# Patient Record
Sex: MALE | Race: WHITE | NOT HISPANIC OR LATINO | Employment: FULL TIME | URBAN - METROPOLITAN AREA
[De-identification: names, ages, dates, MRNs, and addresses within clinical notes are randomized per-mention and may not be internally consistent; named-entity substitution may affect disease eponyms.]

---

## 2020-04-20 ENCOUNTER — OFFICE VISIT (OUTPATIENT)
Dept: FAMILY MEDICINE | Facility: CLINIC | Age: 30
End: 2020-04-20
Payer: COMMERCIAL

## 2020-04-20 VITALS
DIASTOLIC BLOOD PRESSURE: 80 MMHG | OXYGEN SATURATION: 96 % | TEMPERATURE: 98 F | HEART RATE: 85 BPM | SYSTOLIC BLOOD PRESSURE: 126 MMHG | WEIGHT: 219.81 LBS | BODY MASS INDEX: 31.54 KG/M2

## 2020-04-20 DIAGNOSIS — R30.0 DYSURIA: Primary | ICD-10-CM

## 2020-04-20 LAB
BILIRUB SERPL-MCNC: NORMAL MG/DL
BLOOD URINE, POC: NORMAL
COLOR, POC UA: NORMAL
GLUCOSE UR QL STRIP: NORMAL
KETONES UR QL STRIP: NORMAL
LEUKOCYTE ESTERASE URINE, POC: NORMAL
NITRITE, POC UA: NORMAL
PH, POC UA: 7
PROTEIN, POC: NORMAL
SPECIFIC GRAVITY, POC UA: 1.01
UROBILINOGEN, POC UA: NORMAL

## 2020-04-20 PROCEDURE — 99203 PR OFFICE/OUTPT VISIT, NEW, LEVL III, 30-44 MIN: ICD-10-PCS | Mod: 25,S$GLB,, | Performed by: PHYSICIAN ASSISTANT

## 2020-04-20 PROCEDURE — 87491 CHLMYD TRACH DNA AMP PROBE: CPT

## 2020-04-20 PROCEDURE — 81002 POCT URINE DIPSTICK WITHOUT MICROSCOPE: ICD-10-PCS | Mod: S$GLB,,, | Performed by: PHYSICIAN ASSISTANT

## 2020-04-20 PROCEDURE — 87086 URINE CULTURE/COLONY COUNT: CPT

## 2020-04-20 PROCEDURE — 3008F BODY MASS INDEX DOCD: CPT | Mod: CPTII,S$GLB,, | Performed by: PHYSICIAN ASSISTANT

## 2020-04-20 PROCEDURE — 99203 OFFICE O/P NEW LOW 30 MIN: CPT | Mod: 25,S$GLB,, | Performed by: PHYSICIAN ASSISTANT

## 2020-04-20 PROCEDURE — 81002 URINALYSIS NONAUTO W/O SCOPE: CPT | Mod: S$GLB,,, | Performed by: PHYSICIAN ASSISTANT

## 2020-04-20 PROCEDURE — 3008F PR BODY MASS INDEX (BMI) DOCUMENTED: ICD-10-PCS | Mod: CPTII,S$GLB,, | Performed by: PHYSICIAN ASSISTANT

## 2020-04-20 NOTE — PROGRESS NOTES
Subjective:       Patient ID: Antoine Valdez is a 30 y.o. male.    Chief Complaint: Urinary Tract Infection    Dysuria    This is a new problem. The problem has been resolved. The pain is at a severity of 0/10. The patient is experiencing no pain. There has been no fever. He is sexually active. There is no history of pyelonephritis. Pertinent negatives include no chills, discharge, flank pain, frequency, hematuria, nausea or urgency. He has tried nothing for the symptoms.     Past Medical History:   Diagnosis Date    Allergy     Asthma     Headache(784.0)     Seizures     Strep throat     Ulcerative colitis        Review of Systems   Constitutional: Negative for chills, fatigue and fever.   Respiratory: Negative for chest tightness and shortness of breath.    Cardiovascular: Negative for chest pain.   Gastrointestinal: Negative for abdominal pain and nausea.   Genitourinary: Positive for dysuria. Negative for flank pain, frequency, hematuria and urgency.       Objective:      Physical Exam   Constitutional: He appears well-developed and well-nourished. No distress.   Cardiovascular: Normal rate, regular rhythm, normal heart sounds and intact distal pulses. Exam reveals no gallop and no friction rub.   No murmur heard.  Pulmonary/Chest: Effort normal and breath sounds normal. No stridor. No respiratory distress. He has no wheezes. He has no rales. He exhibits no tenderness.   Abdominal: Soft. Bowel sounds are normal. There is no tenderness.   Skin: He is not diaphoretic.   Nursing note and vitals reviewed.      Assessment:       1. Dysuria        Plan:       Dysuria  -     C. trachomatis/N. gonorrhoeae by AMP DNA  -     POCT urine dipstick without microscope  -     Urine culture

## 2020-04-21 LAB
BACTERIA UR CULT: NO GROWTH
C TRACH DNA SPEC QL NAA+PROBE: NOT DETECTED
N GONORRHOEA DNA SPEC QL NAA+PROBE: NOT DETECTED

## 2021-05-10 ENCOUNTER — PATIENT MESSAGE (OUTPATIENT)
Dept: RESEARCH | Facility: HOSPITAL | Age: 31
End: 2021-05-10

## 2021-08-09 ENCOUNTER — TELEPHONE (OUTPATIENT)
Dept: GASTROENTEROLOGY | Facility: CLINIC | Age: 31
End: 2021-08-09

## 2021-08-24 ENCOUNTER — TELEPHONE (OUTPATIENT)
Dept: GASTROENTEROLOGY | Facility: CLINIC | Age: 31
End: 2021-08-24

## 2021-09-10 ENCOUNTER — TELEPHONE (OUTPATIENT)
Dept: GASTROENTEROLOGY | Facility: CLINIC | Age: 31
End: 2021-09-10

## 2022-10-03 ENCOUNTER — OFFICE VISIT (OUTPATIENT)
Dept: SURGERY | Facility: CLINIC | Age: 32
End: 2022-10-03
Payer: COMMERCIAL

## 2022-10-03 VITALS
BODY MASS INDEX: 33.67 KG/M2 | HEART RATE: 75 BPM | HEIGHT: 69 IN | WEIGHT: 227.31 LBS | DIASTOLIC BLOOD PRESSURE: 72 MMHG | SYSTOLIC BLOOD PRESSURE: 127 MMHG | TEMPERATURE: 98 F

## 2022-10-03 DIAGNOSIS — K42.9 UMBILICAL HERNIA WITHOUT OBSTRUCTION AND WITHOUT GANGRENE: Primary | ICD-10-CM

## 2022-10-03 PROCEDURE — 3078F PR MOST RECENT DIASTOLIC BLOOD PRESSURE < 80 MM HG: ICD-10-PCS | Mod: CPTII,S$GLB,, | Performed by: SURGERY

## 2022-10-03 PROCEDURE — 3078F DIAST BP <80 MM HG: CPT | Mod: CPTII,S$GLB,, | Performed by: SURGERY

## 2022-10-03 PROCEDURE — 99999 PR PBB SHADOW E&M-EST. PATIENT-LVL III: ICD-10-PCS | Mod: PBBFAC,,, | Performed by: SURGERY

## 2022-10-03 PROCEDURE — 99999 PR PBB SHADOW E&M-EST. PATIENT-LVL III: CPT | Mod: PBBFAC,,, | Performed by: SURGERY

## 2022-10-03 PROCEDURE — 3008F BODY MASS INDEX DOCD: CPT | Mod: CPTII,S$GLB,, | Performed by: SURGERY

## 2022-10-03 PROCEDURE — 3074F PR MOST RECENT SYSTOLIC BLOOD PRESSURE < 130 MM HG: ICD-10-PCS | Mod: CPTII,S$GLB,, | Performed by: SURGERY

## 2022-10-03 PROCEDURE — 1160F RVW MEDS BY RX/DR IN RCRD: CPT | Mod: CPTII,S$GLB,, | Performed by: SURGERY

## 2022-10-03 PROCEDURE — 1160F PR REVIEW ALL MEDS BY PRESCRIBER/CLIN PHARMACIST DOCUMENTED: ICD-10-PCS | Mod: CPTII,S$GLB,, | Performed by: SURGERY

## 2022-10-03 PROCEDURE — 3074F SYST BP LT 130 MM HG: CPT | Mod: CPTII,S$GLB,, | Performed by: SURGERY

## 2022-10-03 PROCEDURE — 99203 OFFICE O/P NEW LOW 30 MIN: CPT | Mod: S$GLB,,, | Performed by: SURGERY

## 2022-10-03 PROCEDURE — 1159F PR MEDICATION LIST DOCUMENTED IN MEDICAL RECORD: ICD-10-PCS | Mod: CPTII,S$GLB,, | Performed by: SURGERY

## 2022-10-03 PROCEDURE — 3008F PR BODY MASS INDEX (BMI) DOCUMENTED: ICD-10-PCS | Mod: CPTII,S$GLB,, | Performed by: SURGERY

## 2022-10-03 PROCEDURE — 99203 PR OFFICE/OUTPT VISIT, NEW, LEVL III, 30-44 MIN: ICD-10-PCS | Mod: S$GLB,,, | Performed by: SURGERY

## 2022-10-03 PROCEDURE — 1159F MED LIST DOCD IN RCRD: CPT | Mod: CPTII,S$GLB,, | Performed by: SURGERY

## 2022-10-03 RX ORDER — TESTOSTERONE CYPIONATE 200 MG/ML
200 INJECTION, SOLUTION INTRAMUSCULAR
Status: ON HOLD | COMMUNITY
End: 2023-02-23 | Stop reason: HOSPADM

## 2022-10-03 RX ORDER — LEVOTHYROXINE SODIUM 50 UG/1
50 TABLET ORAL
Status: ON HOLD | COMMUNITY
End: 2023-02-08

## 2022-10-03 RX ORDER — DEXTROAMPHETAMINE SACCHARATE, AMPHETAMINE ASPARTATE MONOHYDRATE, DEXTROAMPHETAMINE SULFATE AND AMPHETAMINE SULFATE 7.5; 7.5; 7.5; 7.5 MG/1; MG/1; MG/1; MG/1
30 CAPSULE, EXTENDED RELEASE ORAL 2 TIMES DAILY
COMMUNITY
Start: 2022-10-03 | End: 2023-05-02 | Stop reason: SDUPTHER

## 2022-10-03 NOTE — PROGRESS NOTES
Subjective:       Patient ID: Antoine Valdez is a 32 y.o. male.    Chief Complaint: Consult ()      HPI 32-year-old male presents with a complaint of an umbilical hernia.  He says this has been present for more than a year.  He denies any nausea or vomiting.  He is had no previous surgeries.  He wanted to have this checked in case he may need to have surgery.    Past Medical History:   Diagnosis Date    Allergy     Asthma     Headache(784.0)     Strep throat     Ulcerative colitis      Past Surgical History:   Procedure Laterality Date    LEG SURGERY           Current Outpatient Medications:     dextroamphetamine-amphetamine (ADDERALL XR) 30 MG 24 hr capsule, Take 30 mg by mouth 2 (two) times a day., Disp: , Rfl:     levothyroxine (SYNTHROID) 50 MCG tablet, Take 50 mcg by mouth before breakfast., Disp: , Rfl:     testosterone cypionate (DEPOTESTOTERONE CYPIONATE) 200 mg/mL injection, Inject 200 mg into the muscle every 7 days., Disp: , Rfl:     Review of patient's allergies indicates:   Allergen Reactions    Morphine Itching       Family History   Problem Relation Age of Onset    Diabetes Mother     Heart failure Father      Social History     Socioeconomic History    Marital status:    Tobacco Use    Smoking status: Never    Smokeless tobacco: Never   Substance and Sexual Activity    Alcohol use: No    Drug use: Never    Sexual activity: Yes     Partners: Female       Review of Systems   Respiratory: Negative.     Cardiovascular: Negative.    Gastrointestinal: Negative.    Objective:     Physical Exam  Constitutional:       General: He is not in acute distress.     Appearance: He is well-developed.   HENT:      Head: Normocephalic and atraumatic.   Eyes:      General: No scleral icterus.     Conjunctiva/sclera: Conjunctivae normal.      Pupils: Pupils are equal, round, and reactive to light.   Neck:      Thyroid: No thyromegaly.   Cardiovascular:      Rate and Rhythm: Normal rate and  regular rhythm.      Heart sounds: Normal heart sounds. No murmur heard.  Pulmonary:      Effort: Pulmonary effort is normal. No respiratory distress.      Breath sounds: Normal breath sounds. No wheezing or rales.   Abdominal:      General: Bowel sounds are normal. There is no distension.      Palpations: Abdomen is soft.      Tenderness: There is no abdominal tenderness.      Hernia: A hernia is present.      Comments: There is a small easily reducible umbilical hernia.  Just superior to this within 2 cm is what feels like a 2nd defect.  This is easily reducible.  There is some slight tenderness on manipulation but overall not very tender.   Musculoskeletal:         General: No tenderness. Normal range of motion.      Cervical back: Normal range of motion and neck supple.   Lymphadenopathy:      Cervical: No cervical adenopathy.   Skin:     General: Skin is warm and dry.      Findings: No erythema or rash.   Neurological:      Mental Status: He is alert and oriented to person, place, and time.   Psychiatric:         Behavior: Behavior normal.         Judgment: Judgment normal.     Assessment:     Encounter Diagnosis   Name Primary?    Umbilical hernia without obstruction and without gangrene Yes       Plan:      Discussed the option of repair.  We would probably proceed with a open repair possibly with mesh implantation.  The defect is very small.  Patient would like to consider this and perhaps proceed with surgery at some point next year.  He will call when he is ready to proceed.

## 2022-10-14 RX ORDER — AMOXICILLIN 875 MG/1
875 TABLET, FILM COATED ORAL EVERY 12 HOURS
Qty: 20 TABLET | Refills: 1 | Status: SHIPPED | OUTPATIENT
Start: 2022-10-14 | End: 2023-01-11

## 2022-10-14 RX ORDER — IBUPROFEN 800 MG/1
800 TABLET ORAL 3 TIMES DAILY
Qty: 60 TABLET | Refills: 1 | Status: SHIPPED | OUTPATIENT
Start: 2022-10-14 | End: 2023-01-11

## 2023-01-12 ENCOUNTER — TELEPHONE (OUTPATIENT)
Dept: GASTROENTEROLOGY | Facility: CLINIC | Age: 33
End: 2023-01-12
Payer: COMMERCIAL

## 2023-01-12 NOTE — TELEPHONE ENCOUNTER
There is nothing sooner at this time as Dr. Cobb is on maternity leave and he has the soonest available with the nurse practitioner.  He was added to the wait list.

## 2023-01-12 NOTE — TELEPHONE ENCOUNTER
----- Message from Megan Reis RRT sent at 1/11/2023 11:49 AM CST -----  Regarding: f/u [facility] ED 1/11 - UC/diarrhea  Patient initially scheduled an appointment with Dr. Cobb on 3/10. He has UC but has been in remission for a while. He is currently experiencing problems and having a lot of diarrhea. He came to the ED on 1/11. I scheduled an earlier appointment for him on 2/6 with Ele Wadsworth. Is there any way that he can be scheduled sooner since he's having problems? Please contact him if possible.       Thanks!    TONI Marinelli, RRT  ED Navigator, Case Management  [phone]  [facility]

## 2023-02-03 ENCOUNTER — TELEPHONE (OUTPATIENT)
Dept: GASTROENTEROLOGY | Facility: CLINIC | Age: 33
End: 2023-02-03
Payer: COMMERCIAL

## 2023-02-03 NOTE — TELEPHONE ENCOUNTER
I was able to move his 3/10 appointment with Dr. Cobb up to 3/2 for 3 pm.     Patient advised of appointment change, and that his appointment with the nurse practitioner was canceled.

## 2023-02-06 ENCOUNTER — PATIENT MESSAGE (OUTPATIENT)
Dept: GASTROENTEROLOGY | Facility: CLINIC | Age: 33
End: 2023-02-06
Payer: COMMERCIAL

## 2023-02-08 PROBLEM — F41.9 ANXIETY: Status: ACTIVE | Noted: 2023-02-08

## 2023-02-08 PROBLEM — A41.9 SEVERE SEPSIS: Status: ACTIVE | Noted: 2023-02-08

## 2023-02-08 PROBLEM — N17.9 AKI (ACUTE KIDNEY INJURY): Status: ACTIVE | Noted: 2023-02-08

## 2023-02-08 PROBLEM — M00.9 SEPTIC ARTHRITIS OF KNEE, LEFT: Status: ACTIVE | Noted: 2023-02-08

## 2023-02-08 PROBLEM — E87.1 HYPONATREMIA: Status: ACTIVE | Noted: 2023-02-08

## 2023-02-08 PROBLEM — R65.20 SEVERE SEPSIS: Status: ACTIVE | Noted: 2023-02-08

## 2023-02-09 PROBLEM — A41.9 SEPSIS: Status: ACTIVE | Noted: 2023-02-09

## 2023-02-10 PROBLEM — T81.40XA POSTOPERATIVE INFECTION: Status: ACTIVE | Noted: 2023-02-10

## 2023-02-10 PROBLEM — H57.02 ANISOCORIA: Status: ACTIVE | Noted: 2023-02-10

## 2023-02-11 PROBLEM — I82.621 ACUTE DEEP VEIN THROMBOSIS (DVT) OF BRACHIAL VEIN OF RIGHT UPPER EXTREMITY: Status: ACTIVE | Noted: 2023-02-11

## 2023-02-17 PROBLEM — A41.9 SEPSIS: Status: RESOLVED | Noted: 2023-02-09 | Resolved: 2023-02-17

## 2023-02-27 ENCOUNTER — PATIENT MESSAGE (OUTPATIENT)
Dept: GASTROENTEROLOGY | Facility: CLINIC | Age: 33
End: 2023-02-27
Payer: COMMERCIAL

## 2023-03-02 ENCOUNTER — OFFICE VISIT (OUTPATIENT)
Dept: GASTROENTEROLOGY | Facility: CLINIC | Age: 33
End: 2023-03-02
Payer: COMMERCIAL

## 2023-03-02 DIAGNOSIS — K51.919 ULCERATIVE COLITIS WITH COMPLICATION, UNSPECIFIED LOCATION: Primary | ICD-10-CM

## 2023-03-02 PROCEDURE — 1111F DSCHRG MED/CURRENT MED MERGE: CPT | Mod: CPTII,95,, | Performed by: INTERNAL MEDICINE

## 2023-03-02 PROCEDURE — 99204 OFFICE O/P NEW MOD 45 MIN: CPT | Mod: 95,,, | Performed by: INTERNAL MEDICINE

## 2023-03-02 PROCEDURE — 99204 PR OFFICE/OUTPT VISIT, NEW, LEVL IV, 45-59 MIN: ICD-10-PCS | Mod: 95,,, | Performed by: INTERNAL MEDICINE

## 2023-03-02 PROCEDURE — 1111F PR DISCHARGE MEDS RECONCILED W/ CURRENT OUTPATIENT MED LIST: ICD-10-PCS | Mod: CPTII,95,, | Performed by: INTERNAL MEDICINE

## 2023-03-02 NOTE — PROGRESS NOTES
The patient location is: [place]  The chief complaint leading to consultation is: Ulcerative colitis    Visit type: audiovisual    Face to Face time with patient: 30 minutes  45 minutes of total time spent on the encounter, which includes face to face time and non-face to face time preparing to see the patient (eg, review of tests), Obtaining and/or reviewing separately obtained history, Documenting clinical information in the electronic or other health record, Independently interpreting results (not separately reported) and communicating results to the patient/family/caregiver, or Care coordination (not separately reported).     Each patient to whom he or she provides medical services by telemedicine is:  (1) informed of the relationship between the physician and patient and the respective role of any other health care provider with respect to management of the patient; and (2) notified that he or she may decline to receive medical services by telemedicine and may withdraw from such care at any time.    Notes:     CC: Ulcerative colitis    HPI 32 y.o. male with ADHD, anxiety and hypothyroidism who presents for evaluation of reported history of ulcerative colitis. He states that many years ago he was diagnosed with UC by Dr. Palacio. He had a severe flare up in 2013. At the time he was very sick and required hospitalization in [facility] for several weeks. He had severe c.diff requiring stool transplant. He was on Remicade for 4 months and was in remission but unfortunately developed a reaction with blistering. He stopped medication and has been fine since. He has not had a colonoscopy for many years. Denies blood in stool, diarrhea. Has bowel movements twice daily. Decided to be seen because family member recommended. Spicy foods and beans trigger symptoms of pain and bloating.   .   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    In adedition  recently in Feb 2023 he was hospitalized with complicated septic arthosis postop infection of left knee and thigh cellulitis after ligamentous surgery. He has been on IV antibiotics. He is on Eliquis due to blood clot in right arm.     Medical records reviewed. Additional history supplemented by nursing.     Past Medical History:   Diagnosis Date    Allergy     Asthma     Headache(784.0)     Strep throat     Ulcerative colitis      Past Surgical History:   Procedure Laterality Date    CLOSURE OF WOUND Left 2/22/2023    Procedure: CLOSURE, WOUND;  Surgeon: Dante Moulton MD;  Location: [facility] OR;  Service: Orthopedics;  Laterality: Left;    INCISION AND DRAINAGE Left 2/22/2023    Procedure: Incision and Drainage-Knee;  Surgeon: Dante Moulton MD;  Location: [facility] OR;  Service: Orthopedics;  Laterality: Left;    INCISION AND DRAINAGE OF KNEE Left 2/8/2023    Procedure: INCISION AND DRAINAGE, KNEE;  Surgeon: Dante Moulton MD;  Location: [facility] OR;  Service: Orthopedics;  Laterality: Left;    IRRIGATION AND DEBRIDEMENT Left 2/10/2023    Procedure: IRRIGATION AND DEBRIDEMENT LEFT HIP & KNEE W/ WOUND VAC PLACEMENT;  Surgeon: Dante Moulton MD;  Location: [facility] OR;  Service: Orthopedics;  Laterality: Left;    LEG SURGERY       Social History  Social History     Tobacco Use    Smoking status: Never    Smokeless tobacco: Never   Substance Use Topics    Alcohol use: No    Drug use: Never     Family History   Problem Relation Age of Onset    Diabetes Mother     Heart failure Father      Review of Systems  General ROS: negative for chills, fever or weight loss  Psychological ROS: negative for hallucination, depression or suicidal ideation  Ophthalmic ROS: negative for blurry vision, photophobia or eye pain  ENT ROS: negative for epistaxis, sore throat or rhinorrhea  Respiratory ROS: no cough, shortness of breath, or wheezing  Cardiovascular ROS: no chest pain or dyspnea on exertion  Gastrointestinal ROS: no  "abdominal pain, change in bowel habits, or black/ bloody stools  Genito-Urinary ROS: no dysuria, trouble voiding, or hematuria  Musculoskeletal ROS: +gait disturbance, +muscular weakness  Neurological ROS: no syncope or seizures; no ataxia  Dermatological ROS: negative for pruritis, rash and jaundice    Physical Examination  VS: Ht: 5'10", Wt 99.3 kg  General appearance: alert, cooperative, no distress  HENT: Normocephalic, atraumatic, neck symmetrical  Eyes: conjunctivae clear  Lungs: No labored breathing  Skin: No jaundice  Neurologic: Alert and oriented X 3    Labs:  Lab Results   Component Value Date    WBC 8.57 02/23/2023    HGB 12.3 (L) 02/23/2023    HCT 37.6 (L) 02/23/2023    MCV 85 02/23/2023     (H) 02/23/2023     CMP  Sodium   Date Value Ref Range Status   02/23/2023 135 (L) 136 - 145 mmol/L Final   02/23/2023 135 (L) 136 - 145 mmol/L Final     Potassium   Date Value Ref Range Status   02/23/2023 4.0 3.5 - 5.1 mmol/L Final   02/23/2023 4.0 3.5 - 5.1 mmol/L Final     Chloride   Date Value Ref Range Status   02/23/2023 102 95 - 110 mmol/L Final   02/23/2023 102 95 - 110 mmol/L Final     CO2   Date Value Ref Range Status   02/23/2023 31 22 - 31 mmol/L Final   02/23/2023 31 22 - 31 mmol/L Final     Glucose   Date Value Ref Range Status   02/23/2023 107 70 - 110 mg/dL Final     Comment:     The ADA recommends the following guidelines for fasting glucose:    Normal:       less than 100 mg/dL    Prediabetes:  100 mg/dL to 125 mg/dL    Diabetes:     126 mg/dL or higher     02/23/2023 107 70 - 110 mg/dL Final     Comment:     The ADA recommends the following guidelines for fasting glucose:    Normal:       less than 100 mg/dL    Prediabetes:  100 mg/dL to 125 mg/dL    Diabetes:     126 mg/dL or higher       BUN   Date Value Ref Range Status   02/23/2023 19 9 - 21 mg/dL Final   02/23/2023 19 9 - 21 mg/dL Final     Creatinine   Date Value Ref Range Status   02/23/2023 1.16 0.50 - 1.40 mg/dL Final   02/23/2023 " 1.16 0.50 - 1.40 mg/dL Final     Calcium   Date Value Ref Range Status   02/23/2023 8.5 8.4 - 10.2 mg/dL Final   02/23/2023 8.5 8.4 - 10.2 mg/dL Final     Total Protein   Date Value Ref Range Status   02/16/2023 7.6 6.0 - 8.4 g/dL Final     Albumin   Date Value Ref Range Status   02/16/2023 3.8 3.5 - 5.2 g/dL Final     Total Bilirubin   Date Value Ref Range Status   02/16/2023 0.6 0.2 - 1.3 mg/dL Final     Alkaline Phosphatase   Date Value Ref Range Status   02/16/2023 56 38 - 145 U/L Final     AST   Date Value Ref Range Status   02/16/2023 64 (H) 17 - 59 U/L Final     ALT   Date Value Ref Range Status   02/16/2023 57 (H) 0 - 50 U/L Final     Anion Gap   Date Value Ref Range Status   02/23/2023 2 (L) 8 - 16 mmol/L Final   02/23/2023 2 (L) 8 - 16 mmol/L Final     eGFR if    Date Value Ref Range Status   02/03/2022 >60 >60 mL/min/1.73 m^2 Final     eGFR if non    Date Value Ref Range Status   02/03/2022 >60 >60 mL/min/1.73 m^2 Final     Comment:     Calculation used to obtain the estimated glomerular filtration  rate (eGFR) is the CKD-EPI equation.          Imaging:  US Soft tissue:  Impression:     Two complex collections within the left thigh with more distal collection appearing likely intramuscular. These could reflect hematomas or abscesses.     Independently reviewed    Assessment:   History of ulcerative colitis  History of c.diff requiring fecal transplant  History of Remicade use in past and development of reaction (blistering)  Post-operative Septic arthritis   On Eliquis for brachial vein thrombosis    Plan:   -Staging of bowel with colonoscopy  -Consider CTE or MRE depending on findings  -CRP  -Fecal calprotectin  -Obtain records from Dr. Palacio office including colonoscopy and path results from original diagnosis  -Follow up after colonoscopy      Jarret Cobb MD  [facility]  [address] [place] [address]  Office: [phone]  [phone]  Fax: [phone]

## 2023-03-06 ENCOUNTER — TELEPHONE (OUTPATIENT)
Dept: INFECTIOUS DISEASES | Facility: CLINIC | Age: 33
End: 2023-03-06
Payer: COMMERCIAL

## 2023-03-06 ENCOUNTER — TELEPHONE (OUTPATIENT)
Dept: HEMATOLOGY/ONCOLOGY | Facility: CLINIC | Age: 33
End: 2023-03-06
Payer: COMMERCIAL

## 2023-03-06 ENCOUNTER — LAB VISIT (OUTPATIENT)
Dept: LAB | Facility: HOSPITAL | Age: 33
End: 2023-03-06
Attending: INTERNAL MEDICINE
Payer: COMMERCIAL

## 2023-03-06 DIAGNOSIS — T81.41XD INFECTION FOLLOWING A PROCEDURE, SUPERFICIAL INCISIONAL SURGICAL SITE, SUBSEQUENT ENCOUNTER: Primary | ICD-10-CM

## 2023-03-06 LAB
ALBUMIN SERPL BCP-MCNC: 3.7 G/DL (ref 3.5–5.2)
ALP SERPL-CCNC: 52 U/L (ref 55–135)
ALT SERPL W/O P-5'-P-CCNC: 11 U/L (ref 10–44)
ANION GAP SERPL CALC-SCNC: 7 MMOL/L (ref 8–16)
AST SERPL-CCNC: 26 U/L (ref 10–40)
BASOPHILS # BLD AUTO: 0.05 K/UL (ref 0–0.2)
BASOPHILS NFR BLD: 1 % (ref 0–1.9)
BILIRUB SERPL-MCNC: 0.4 MG/DL (ref 0.1–1)
BUN SERPL-MCNC: 20 MG/DL (ref 6–20)
CALCIUM SERPL-MCNC: 8.9 MG/DL (ref 8.7–10.5)
CHLORIDE SERPL-SCNC: 102 MMOL/L (ref 95–110)
CO2 SERPL-SCNC: 28 MMOL/L (ref 23–29)
CREAT SERPL-MCNC: 1.1 MG/DL (ref 0.5–1.4)
CRP SERPL-MCNC: 1.5 MG/L (ref 0–8.2)
DIFFERENTIAL METHOD: ABNORMAL
EOSINOPHIL # BLD AUTO: 0.2 K/UL (ref 0–0.5)
EOSINOPHIL NFR BLD: 3.8 % (ref 0–8)
ERYTHROCYTE [DISTWIDTH] IN BLOOD BY AUTOMATED COUNT: 14.9 % (ref 11.5–14.5)
EST. GFR  (NO RACE VARIABLE): >60 ML/MIN/1.73 M^2
GLUCOSE SERPL-MCNC: 66 MG/DL (ref 70–110)
HCT VFR BLD AUTO: 40.7 % (ref 40–54)
HGB BLD-MCNC: 13.2 G/DL (ref 14–18)
IMM GRANULOCYTES # BLD AUTO: 0.02 K/UL (ref 0–0.04)
IMM GRANULOCYTES NFR BLD AUTO: 0.4 % (ref 0–0.5)
LYMPHOCYTES # BLD AUTO: 0.8 K/UL (ref 1–4.8)
LYMPHOCYTES NFR BLD: 15.3 % (ref 18–48)
MCH RBC QN AUTO: 28 PG (ref 27–31)
MCHC RBC AUTO-ENTMCNC: 32.4 G/DL (ref 32–36)
MCV RBC AUTO: 86 FL (ref 82–98)
MONOCYTES # BLD AUTO: 0.5 K/UL (ref 0.3–1)
MONOCYTES NFR BLD: 10.1 % (ref 4–15)
NEUTROPHILS # BLD AUTO: 3.6 K/UL (ref 1.8–7.7)
NEUTROPHILS NFR BLD: 69.4 % (ref 38–73)
NRBC BLD-RTO: 0 /100 WBC
PLATELET # BLD AUTO: 281 K/UL (ref 150–450)
PMV BLD AUTO: 8.9 FL (ref 9.2–12.9)
POTASSIUM SERPL-SCNC: 4.8 MMOL/L (ref 3.5–5.1)
PROT SERPL-MCNC: 6.8 G/DL (ref 6–8.4)
RBC # BLD AUTO: 4.72 M/UL (ref 4.6–6.2)
SODIUM SERPL-SCNC: 137 MMOL/L (ref 136–145)
WBC # BLD AUTO: 5.23 K/UL (ref 3.9–12.7)

## 2023-03-06 PROCEDURE — 80053 COMPREHEN METABOLIC PANEL: CPT | Performed by: INTERNAL MEDICINE

## 2023-03-06 PROCEDURE — 85025 COMPLETE CBC W/AUTO DIFF WBC: CPT | Performed by: INTERNAL MEDICINE

## 2023-03-06 PROCEDURE — 86140 C-REACTIVE PROTEIN: CPT | Performed by: INTERNAL MEDICINE

## 2023-03-06 NOTE — TELEPHONE ENCOUNTER
----- Message from Brittany Salinas sent at 3/6/2023 10:41 AM CST -----  Type: Needs Medical Advice  Who Called:  Marilyn(spouse)  Symptoms (please be specific):    How long has patient had these symptoms:    Pharmacy name and phone #:    Best Call Back Number:   Additional Information: Marilyn is requesting a call back to schedule an appt. for her  from hosp. visit.

## 2023-03-06 NOTE — TELEPHONE ENCOUNTER
Called and spoke to pt and wife on speaker phone.  Offered thurs morning for 8am, decliniced.    Pt scheduled from hem referral for Friday.  Confirmed appt date time and location.

## 2023-03-06 NOTE — TELEPHONE ENCOUNTER
----- Message from Marsha Chavez sent at 3/6/2023  1:49 PM CST -----  Contact: [phone]  Type:  Pharmacy Calling to Clarify an RX    Name of Caller:  Светлана  Pharmacy Name:  [facility]     Prescription Name:  Iv medication   What do they need to clarify?:  Pts orders end March 8th   Best Call Back Number:  [phone]  Additional Information:  Pls fax orders to Fax [phone]   If pt will need to continue

## 2023-03-07 NOTE — TELEPHONE ENCOUNTER
Spoke to Светлана and they have the order to go until the 8th and informed her just to go to the 8th. We will be seeing pt on the 9th. She verbalized understanding

## 2023-03-09 ENCOUNTER — OFFICE VISIT (OUTPATIENT)
Dept: INFECTIOUS DISEASES | Facility: CLINIC | Age: 33
End: 2023-03-09
Payer: COMMERCIAL

## 2023-03-09 ENCOUNTER — PATIENT MESSAGE (OUTPATIENT)
Dept: INFECTIOUS DISEASES | Facility: CLINIC | Age: 33
End: 2023-03-09
Payer: COMMERCIAL

## 2023-03-09 VITALS — HEIGHT: 70 IN | BODY MASS INDEX: 31.35 KG/M2 | WEIGHT: 219 LBS

## 2023-03-09 DIAGNOSIS — M00.062 STAPHYLOCOCCAL ARTHRITIS OF LEFT KNEE: Primary | ICD-10-CM

## 2023-03-09 DIAGNOSIS — M60.059 ABSCESS OF MUSCLE OF THIGH: ICD-10-CM

## 2023-03-09 PROCEDURE — 99213 OFFICE O/P EST LOW 20 MIN: CPT | Mod: PBBFAC,PO | Performed by: PHYSICIAN ASSISTANT

## 2023-03-09 PROCEDURE — 99999 PR PBB SHADOW E&M-EST. PATIENT-LVL III: CPT | Mod: PBBFAC,,, | Performed by: PHYSICIAN ASSISTANT

## 2023-03-09 PROCEDURE — 99999 PR PBB SHADOW E&M-EST. PATIENT-LVL III: ICD-10-PCS | Mod: PBBFAC,,, | Performed by: PHYSICIAN ASSISTANT

## 2023-03-09 PROCEDURE — 99215 OFFICE O/P EST HI 40 MIN: CPT | Mod: S$GLB,,, | Performed by: PHYSICIAN ASSISTANT

## 2023-03-09 PROCEDURE — 99215 PR OFFICE/OUTPT VISIT, EST, LEVL V, 40-54 MIN: ICD-10-PCS | Mod: S$GLB,,, | Performed by: PHYSICIAN ASSISTANT

## 2023-03-09 RX ORDER — LINEZOLID 600 MG/1
600 TABLET, FILM COATED ORAL EVERY 12 HOURS
Qty: 14 TABLET | Refills: 0 | Status: SHIPPED | OUTPATIENT
Start: 2023-03-09 | End: 2023-03-16

## 2023-03-09 NOTE — PROGRESS NOTES
[facility]  Infectious Disease        Patient ID: Antoine Valdez is a 33 y.o. male.    Chief Complaint: Follow-up      Antoine was seen today for follow-up.    Diagnoses and all orders for this visit:    Staphylococcal arthritis of left knee  -     linezolid (ZYVOX) 600 mg Tab; Take 1 tablet (600 mg total) by mouth every 12 (twelve) hours. for 7 days    Abscess of muscle of thigh         Greater than 60 minutes was spent on this encounter, which included: review of recent encounters, review and interpretation of labs/images, obtaining pertinent history, performing a physical examination, counseling and educating the patient/family/caregiver, ordering medications/tests, documenting in the electronic health record, and coordinating care with necessary providers.    Interval HPI:   2/9 - Initial consult. Patient c/o chills and left knee pain and swelling proximal to surgical site on his lateral leg. Tmax 100.3F today.   2/10 - afebrile but still having chills and left thigh/knee pain, swelling, tightness, and warmth. Says he feels about the same as yesterday. Also with new anisocoria, right pupil dilated. C/o worst headache of his life yesterday which is now resolved. Neuro consulted.  2/13 - patient evaluated at bedside.  Tells me that he is feeling better.  Redness on the left flank/thigh is much better.  Afebrile.  Receiving Ancef and linezolid with no side effects.    Hospital course was prolonged due to DVT about the initial PICC site requiring removal and initiation of Eliquis, continued left leg pain leading to US which showed 2 complex fluid collections suspicious for hematoma and leading to drain placement and wound vac, and return to OR for wound closure. He was eventually discharged on 2/23 with IV Ancef continuous infusion via midline.    3/9 - ID clinic f/u. Patient initially c/o overall bad feeling and queasiness, felt like his blood sugar was low. Apparently he has had this  "issue occur before his current infection while he was working if he had not eaten recently. Clinic glucometer not functioning for assessment but he was given cookies and immediately felt better. Vitals were stable throughout. Rest of visit proceeded without issue and he reports he has been feeling fine at home. He does not have a diagnosis of diabetes to date. In regards to his infection, he has been feeling okay since discharge but he does report his left leg is still swollen and red compared to the right. Also started having a little more pain overnight. He is still on Eliquis and is consequently having continued bleeding from his leg incisions soaking through his bandages sometimes. Denies fevers, chills, night sweats, n/v, or diarrhea. Labs reviewed and have normalized.     Assessment and Plan:      # Native joint septic arthritis, left knee  - see HPI, infection following quad tendon repair on 2/1  - 2/7 knee aspiration performed at outside office - records received with just "Staph aureus" to date, pending susceptibilities  - 2/6 Bcx: NGTD  - 2/8 Bcx: NGTD  - 2/8/23 s/p I&D of the left knee where 50-75cc of purulence were encountered upon entering the surgical wound and extending into the knee joint.  - OR cx: MSSA  - 2/10 CT Left leg/knee/thigh: mod-large knee joint fluid with intra-articular gas and synovial enhancement, fluid tracking from suprapatellar recess to the vastus intermedius and subQ soft tissues with locules of gas, possible phlegmon/early abscess in the lateral distal left thigh, and extensive soft tissue edema  - 2/10 underwent I&D. Cultures not obtained.  - 2/11 due to worsening erythema, Linezolid added  - 2/22 returned to OR for irrigation and wound closure. No mention of purulence or signs of infection. No cultures obtained.  - eventually discharged on IV Ancef continuous infusion x4 weeks (EOC 3/8/23) and PO Linezolid for 2 weeks (EOC 2/25)     # Left thigh abscess  - 2/10/23 S/p I&D. " Minimal collection noted. Cultures not obtained.   - 2/16 Ortho ordered ultrasound and noted to have two complex fluid collections 10.1 x 1.9 x 5.1 and more distal one likely intramuscular measuring 16.0 x 4.2 x 3.4 cm. Felt to be hematoma vs abscess  - 2/17 IR placed a drain with removal of what was felt to be hematoma. No cultures obtained  - 2/22 returned to OR for irrigation and wound closure. No mention of purulence or signs of infection. No cultures obtained.     # History of MRSA skin infections    # DVT right arm  - on eliquis    # Episode of questionable hypoglycemia  - as above. Unable to confirm due to glucometer not working. Symptoms resolved     Recommendations:  - He has completed 4 weeks of IV Ancef. Labs reviewed and CRP, CBC, and CMP are all WNL. However he does still have LLE edema and reports some increased pain today  - okay to d/c IV abx  - midline pulled. 12cm intact catheter removed without complication. Patient tolerated well.  - will give 1 additional week of abx PO linezolid due to concerns for swelling/redness  - patient has f/u with his Orthopedist Dr. Perez this afternoon. He is concerned he may have hematoma/fluid still in his leg. May need to consider imaging for evaluation  - advised he should have f/u with his PCP for evaluation for these transient episodes of feeling hypoglycemic  - if his leg clinically worsens once antibiotics are complete, he will need further workup including imaging and/or aspiration     Discussed with and patient also seen by Dr. Benito Mathews, PA-C  Infectious Diseases  [facility]        HPI:      32yo male with a PMH of reported UC, anxiety on xanax, ADHD on adderall, hypothyroidism, and h/o MRSA skin infections, admitted to [facility] for septic arthritis.     - 1/25/23 patient sustained an injury at work. 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 He was seen in the ED without osseous abnormality and placed in an immobilizer and instructed to f/u with Orthopedic surgery in clinic  - 2/1/23 underwent quadriceps tendon repair with Dr. Moulton at [facility]  - in the days following surgery, experienced worsening pain, redness, and warmth with fever and chills  - 2/6/23 seen by his ortho without further recommendations, and then again at the freestanding ER same day. US was negative for DVT and it was felt he had normal post-op changes. Discharged with PO augmentin  - 2/7/23 seen again at Ortho clinic. Knee aspiration was performed and per patient, bloody pus was aspirated. Cultures grew MSSA. He was instructed to come to the ED for surgery. He was started on IV Vanc and Meropenem due to meeting sepsis parameters  - 2/8/23 s/p I&D of the left knee where 50-75cc of purulence were encountered upon entering the surgical wound and extending into the knee joint.  - OR cx grew MSSA     Infectious Diseases was consulted for evaluation and management of his infected knee.        Past Medical History:   Diagnosis Date    Allergy     Asthma     Headache(784.0)     Strep throat     Ulcerative colitis        Past Surgical History:   Procedure Laterality Date    CLOSURE OF WOUND Left 2/22/2023    Procedure: CLOSURE, WOUND;  Surgeon: Dante Moulton MD;  Location: [facility] OR;  Service: Orthopedics;  Laterality: Left;    INCISION AND DRAINAGE Left 2/22/2023    Procedure: Incision and Drainage-Knee;  Surgeon: Dante Moulton MD;  Location: [facility] OR;  Service: Orthopedics;  Laterality: Left;    INCISION AND DRAINAGE OF KNEE Left 2/8/2023    Procedure: INCISION AND DRAINAGE, KNEE;  Surgeon: Dante Moulton MD;  Location: [facility] OR;  Service: Orthopedics;  Laterality: Left;    IRRIGATION AND DEBRIDEMENT Left 2/10/2023    Procedure: IRRIGATION AND DEBRIDEMENT LEFT HIP & KNEE W/ WOUND VAC PLACEMENT;  Surgeon: Dante TORRES  MD Kenney;  Location: [facility];  Service: Orthopedics;  Laterality: Left;    LEG SURGERY         Family History   Problem Relation Age of Onset    Diabetes Mother     Heart failure Father        Social History     Socioeconomic History    Marital status:    Tobacco Use    Smoking status: Never    Smokeless tobacco: Never   Substance and Sexual Activity    Alcohol use: No    Drug use: Never    Sexual activity: Yes     Partners: Female       Review of patient's allergies indicates:   Allergen Reactions    Morphine Itching       Current Outpatient Medications   Medication Instructions    ALPRAZolam (XANAX) 1 mg, Oral, Daily PRN    apixaban (ELIQUIS) 5 mg, Oral, 2 times daily    CULTURELLE 10 billion cell capsule TAKE 1 CAPSULE BY MOUTH ONCE DAILY. FOR 13 DAYS    dextroamphetamine-amphetamine (ADDERALL XR) 30 MG 24 hr capsule 30 mg, Oral, 2 times daily    gabapentin (NEURONTIN) 300 MG capsule TAKE 1 CAPSULE BY MOUTH EVERY EVENING    heparin sod,porcine/0.9 % NaCl (HEPARIN FLUSH IV) 5 mLs, Intravenous, Daily PRN, IV FLUSH: MIDLINE<BR><BR>SN to Perform/ may Instruct patient/ caregiver to perform IV MIDLINE flush:<BR>Flush before/ after Antibiotic with 10 ml 0.9% Normal saline followed by 5 ml of Heparin (10 units/ml).    ketorolac (TORADOL) 10 mg, Oral, Every 8 hours PRN    levothyroxine (SYNTHROID) 88 mcg, Oral, Before breakfast    linezolid (ZYVOX) 600 mg, Oral, Every 12 hours    sodium chloride 0.9 %, flush, (SALINE FLUSH INJ) 10 mLs, Intravenous, Daily PRN, IV FLUSH: MIDLINE<BR><BR>SN to Perform/ may Instruct patient/ caregiver to perform IV MIDLINE flush:<BR>Flush before/ after Antibiotic with 10 ml 0.9% Normal saline followed by 5 ml of Heparin (10 units/ml).         Review of Systems   Constitutional:  Negative for activity change, appetite change, chills, fatigue and fever.   HENT:  Negative for congestion, mouth sores, sinus pain and sore throat.    Eyes:  Negative for photophobia and visual  disturbance.   Respiratory:  Negative for cough, chest tightness, shortness of breath and wheezing.    Cardiovascular:  Positive for leg swelling. Negative for chest pain and palpitations.   Gastrointestinal:  Negative for abdominal pain, diarrhea, nausea and vomiting.   Genitourinary:  Negative for dysuria, flank pain, hematuria and urgency.   Musculoskeletal:  Positive for arthralgias and joint swelling. Negative for myalgias, neck pain and neck stiffness.   Skin:  Positive for color change. Negative for rash.   Allergic/Immunologic: Negative for immunocompromised state.   Neurological:  Negative for dizziness, seizures and headaches.   Psychiatric/Behavioral:  Negative for confusion. The patient is nervous/anxious.          Objective:     There were no vitals filed for this visit.           Physical Exam  Vitals and nursing note reviewed.   Constitutional:       General: He is awake. He is not in acute distress.     Appearance: He is well-developed and well-groomed. He is not diaphoretic.   HENT:      Head: Normocephalic and atraumatic.      Right Ear: External ear normal.      Left Ear: External ear normal.      Nose: Nose normal.   Eyes:      General: No scleral icterus.        Right eye: No discharge.         Left eye: No discharge.      Pupils: Pupils are equal, round, and reactive to light.   Cardiovascular:      Rate and Rhythm: Normal rate and regular rhythm.      Heart sounds: Normal heart sounds. No murmur heard.  Pulmonary:      Effort: Pulmonary effort is normal. No accessory muscle usage or respiratory distress.      Breath sounds: Normal breath sounds.   Abdominal:      General: Bowel sounds are normal. There is no distension.      Palpations: Abdomen is soft.      Tenderness: There is no abdominal tenderness. There is no guarding.   Musculoskeletal:         General: Swelling and tenderness present.      Cervical back: Normal range of motion and neck supple.      Left lower leg: Edema present.       Comments: Left leg overall more edematous compared to right. Left lateral thigh with edema, induration, warmth, but no significant erythema about his incision site. Fresh bandages in place with sutures, not removed. Images reviewed on patient's phone   Skin:     General: Skin is warm and dry.      Comments: Rosacea    Neurological:      General: No focal deficit present.      Mental Status: He is alert and oriented to person, place, and time.   Psychiatric:         Attention and Perception: Attention normal.         Mood and Affect: Mood is anxious.         Speech: Speech normal.         Behavior: Behavior normal.         Thought Content: Thought content normal.             Estimated Creatinine Clearance: 110.7 mL/min (based on SCr of 1.1 mg/dL).      Microbiology Results (last 7 days)       ** No results found for the last 168 hours. **              Significant Labs: All pertinent labs within the past 24 hours have been reviewed.     Significant Imaging: I have reviewed all relevant and available imaging results/findings within the past 24 hours.      Plan -- see top of note

## 2023-03-10 ENCOUNTER — OFFICE VISIT (OUTPATIENT)
Dept: HEMATOLOGY/ONCOLOGY | Facility: CLINIC | Age: 33
End: 2023-03-10
Payer: COMMERCIAL

## 2023-03-10 VITALS
DIASTOLIC BLOOD PRESSURE: 77 MMHG | TEMPERATURE: 97 F | OXYGEN SATURATION: 97 % | RESPIRATION RATE: 16 BRPM | WEIGHT: 217.63 LBS | HEART RATE: 80 BPM | BODY MASS INDEX: 31.16 KG/M2 | SYSTOLIC BLOOD PRESSURE: 128 MMHG | HEIGHT: 70 IN

## 2023-03-10 DIAGNOSIS — T81.40XA POSTOPERATIVE INFECTION, UNSPECIFIED TYPE, INITIAL ENCOUNTER: ICD-10-CM

## 2023-03-10 DIAGNOSIS — I82.621 ACUTE DEEP VEIN THROMBOSIS (DVT) OF BRACHIAL VEIN OF RIGHT UPPER EXTREMITY: ICD-10-CM

## 2023-03-10 DIAGNOSIS — M00.062 STAPHYLOCOCCAL ARTHRITIS OF LEFT KNEE: ICD-10-CM

## 2023-03-10 DIAGNOSIS — K51.919 ULCERATIVE COLITIS WITH COMPLICATION, UNSPECIFIED LOCATION: Primary | ICD-10-CM

## 2023-03-10 PROBLEM — I82.409 DVT (DEEP VENOUS THROMBOSIS): Status: ACTIVE | Noted: 2023-03-10

## 2023-03-10 PROCEDURE — 3008F PR BODY MASS INDEX (BMI) DOCUMENTED: ICD-10-PCS | Mod: CPTII,S$GLB,, | Performed by: INTERNAL MEDICINE

## 2023-03-10 PROCEDURE — 99205 OFFICE O/P NEW HI 60 MIN: CPT | Mod: S$GLB,,, | Performed by: INTERNAL MEDICINE

## 2023-03-10 PROCEDURE — 1111F PR DISCHARGE MEDS RECONCILED W/ CURRENT OUTPATIENT MED LIST: ICD-10-PCS | Mod: CPTII,S$GLB,, | Performed by: INTERNAL MEDICINE

## 2023-03-10 PROCEDURE — 99999 PR PBB SHADOW E&M-EST. PATIENT-LVL III: ICD-10-PCS | Mod: PBBFAC,,, | Performed by: INTERNAL MEDICINE

## 2023-03-10 PROCEDURE — 3074F PR MOST RECENT SYSTOLIC BLOOD PRESSURE < 130 MM HG: ICD-10-PCS | Mod: CPTII,S$GLB,, | Performed by: INTERNAL MEDICINE

## 2023-03-10 PROCEDURE — 3074F SYST BP LT 130 MM HG: CPT | Mod: CPTII,S$GLB,, | Performed by: INTERNAL MEDICINE

## 2023-03-10 PROCEDURE — 99205 PR OFFICE/OUTPT VISIT, NEW, LEVL V, 60-74 MIN: ICD-10-PCS | Mod: S$GLB,,, | Performed by: INTERNAL MEDICINE

## 2023-03-10 PROCEDURE — 1111F DSCHRG MED/CURRENT MED MERGE: CPT | Mod: CPTII,S$GLB,, | Performed by: INTERNAL MEDICINE

## 2023-03-10 PROCEDURE — 99999 PR PBB SHADOW E&M-EST. PATIENT-LVL III: CPT | Mod: PBBFAC,,, | Performed by: INTERNAL MEDICINE

## 2023-03-10 PROCEDURE — 3078F DIAST BP <80 MM HG: CPT | Mod: CPTII,S$GLB,, | Performed by: INTERNAL MEDICINE

## 2023-03-10 PROCEDURE — 3078F PR MOST RECENT DIASTOLIC BLOOD PRESSURE < 80 MM HG: ICD-10-PCS | Mod: CPTII,S$GLB,, | Performed by: INTERNAL MEDICINE

## 2023-03-10 PROCEDURE — 3008F BODY MASS INDEX DOCD: CPT | Mod: CPTII,S$GLB,, | Performed by: INTERNAL MEDICINE

## 2023-03-10 NOTE — PROGRESS NOTES
Subjective:       Patient ID: Antoine Valdez is a 33 y.o. male.    Chief Complaint: No chief complaint on file.    HPI  Mr Valdez is a 33-year-old male referred for evaluation for an upper extremity DVT/catheter associated thrombosis.  He states that he developed a clot on his right arm immediately after insertion of a PICC line.  An ultrasound on 02/11/2023 showed a brachial vein thrombosis associated with the PICC line.  The PICC line was removed and he was started on Eliquis.  Eliquis was discontinued after 4 weeks (see below).       PAST MEDICAL HISTORY:  He has a history of asthma, ulcerative colitis, scrotal abscess in 2022, as well as history of multiple abscesses in the past related to MRSA.  In early February 2023 he underwent repair of the tendon on his left knee secondary to fall.  Unfortunately he developed septic arthritis requiring a repeat surgery 2 days later.  He also developed cellulitis on the left thigh requiring incision and drainage.  He has undergone a total of 4 operations so far on 2/8, 2/10, and 2/22.  Five days ago the surgeon discontinued the Eliquis due to persistent bleeding from his incision and told him to take aspirin 325 mg twice a day.  PAST SURGICAL HISTORY:  As above.  Since early February he has undergone four operations on his left knee and left thigh.  SOCIAL HISTORY:  He works as a .  He is .  He does not smoke and does not drink more than socially  FAMILY HISTORY:  His mother had cervical cancer      Review of Systems    Overall he feels fair. He is anxious with the recent developments and complains of pain on the left leg from his recent incisions.  He has a knee immobilizer in place and uses a walker for ambulation.  He  denies any depression, easy bruising, fevers, chills, night  sweats, weight loss, nausea, vomiting, diarrhea, constipation, diplopia, blurred vision, headache, chest pain, palpitations, shortness of breath, breast pain, abdominal pain, I  extremity pain, but he does have difficulty ambulating and he is wearing a knee immobilizer.  The remainder of the ten-point ROS, including general, skin, lymph, H/N, cardiorespiratory, GI, , Neuro, Endocrine, and psychiatric is negative.    Objective:      Physical Exam    He is alert, oriented to time, place, person, pleasant, well      nourished, in no acute physical distress.  He is here with his younger brother                                  VITAL SIGNS:  Reviewed                                      HEENT:  Normal.  There are no nasal, oral, lip, gingival, auricular, lid,    or conjunctival lesions.  Mucosae are moist and pink, and there is no        thrush.  Pupils are equal, reactive to light and accommodation.              Extraocular muscle movements are intact.    Dentition is good.  There is no frontal or maxillary tenderness.                                   NECK:  Supple without JVD, adenopathy, or thyromegaly.                       LUNGS:  Clear to auscultation without wheezing, rales, or rhonchi.           CARDIOVASCULAR:  Reveals an S1, S2, no murmurs, no rubs, no gallops.         ABDOMEN:  Soft, nontender, without organomegaly.  Bowel sounds are    present.                                                                     EXTREMITIES:  No cyanosis, clubbing, or edema.  There is 1+ edema on the lower extremity and the skin has a dusky appearance.  He has an incision on his right knee with multiple sutures in place and are not another incision on his left thigh laterally again with multiple sutures in place.  The dressings were not removed but the patient's showed me pictures that were taken yesterday during the dressing replaced                                                             LYMPHATIC:  There is no cervical, axillary, or supraclavicular adenopathy.   SKIN:  Warm and moist, without petechiae, rashes, induration, or ecchymoses.  Multiple tattoos are noted.           NEUROLOGIC:   DTRs are 0-1+ bilaterally, symmetrical, motor function is 5/5,  and cranial nerves are  within normal limits.   Assessment:         Acute DVT of the right brachial vein associated with a PICC line.  This was clearly a provoked DVT  Status post 4 surgeries for infectious complications, right knee and right thigh  History of ulcerative colitis        RECOMMENDATIONS  I had a very long discussion with Mr. Valdez.  I explained to him that for provoked DVTs we typically want to anticoagulate for up to 3 months.  He has already received 4 weeks of anticoagulation and Eliquis was discontinued due to persistent bleeding from both his thigh and knee incisions.  Given the fact that the persistent bleeding from his incisions is essentially a contraindication to anticoagulation, I think it is reasonable to hold the Eliquis at this point.   As far as the aspirin is concerned, I think it would be reasonable to treat him with 91 mg rather than 325 mg twice a day, however, I will defer that decision to his treating physician.  I spent approximately 60 minutes reviewing the available records and evaluating the patient, out of which over 50% of the time was spent face to face with the patient in counseling and coordinating this patient's care.

## 2023-04-18 ENCOUNTER — OFFICE VISIT (OUTPATIENT)
Dept: PRIMARY CARE CLINIC | Facility: CLINIC | Age: 33
End: 2023-04-18
Payer: COMMERCIAL

## 2023-04-18 VITALS
HEART RATE: 94 BPM | OXYGEN SATURATION: 97 % | SYSTOLIC BLOOD PRESSURE: 120 MMHG | DIASTOLIC BLOOD PRESSURE: 70 MMHG | WEIGHT: 221.13 LBS | HEIGHT: 70 IN | BODY MASS INDEX: 31.66 KG/M2

## 2023-04-18 DIAGNOSIS — Z00.00 WELLNESS EXAMINATION: ICD-10-CM

## 2023-04-18 DIAGNOSIS — N52.9 ERECTILE DYSFUNCTION, UNSPECIFIED ERECTILE DYSFUNCTION TYPE: ICD-10-CM

## 2023-04-18 DIAGNOSIS — E03.9 HYPOTHYROIDISM, UNSPECIFIED TYPE: ICD-10-CM

## 2023-04-18 DIAGNOSIS — F98.8 ATTENTION DEFICIT DISORDER, UNSPECIFIED HYPERACTIVITY PRESENCE: Primary | ICD-10-CM

## 2023-04-18 PROCEDURE — 1159F MED LIST DOCD IN RCRD: CPT | Mod: CPTII,S$GLB,, | Performed by: PHYSICIAN ASSISTANT

## 2023-04-18 PROCEDURE — 99385 PR PREVENTIVE VISIT,NEW,18-39: ICD-10-PCS | Mod: S$GLB,,, | Performed by: PHYSICIAN ASSISTANT

## 2023-04-18 PROCEDURE — 1159F PR MEDICATION LIST DOCUMENTED IN MEDICAL RECORD: ICD-10-PCS | Mod: CPTII,S$GLB,, | Performed by: PHYSICIAN ASSISTANT

## 2023-04-18 PROCEDURE — 3074F PR MOST RECENT SYSTOLIC BLOOD PRESSURE < 130 MM HG: ICD-10-PCS | Mod: CPTII,S$GLB,, | Performed by: PHYSICIAN ASSISTANT

## 2023-04-18 PROCEDURE — 3074F SYST BP LT 130 MM HG: CPT | Mod: CPTII,S$GLB,, | Performed by: PHYSICIAN ASSISTANT

## 2023-04-18 PROCEDURE — 3078F DIAST BP <80 MM HG: CPT | Mod: CPTII,S$GLB,, | Performed by: PHYSICIAN ASSISTANT

## 2023-04-18 PROCEDURE — 1160F PR REVIEW ALL MEDS BY PRESCRIBER/CLIN PHARMACIST DOCUMENTED: ICD-10-PCS | Mod: CPTII,S$GLB,, | Performed by: PHYSICIAN ASSISTANT

## 2023-04-18 PROCEDURE — 99385 PREV VISIT NEW AGE 18-39: CPT | Mod: S$GLB,,, | Performed by: PHYSICIAN ASSISTANT

## 2023-04-18 PROCEDURE — 3008F BODY MASS INDEX DOCD: CPT | Mod: CPTII,S$GLB,, | Performed by: PHYSICIAN ASSISTANT

## 2023-04-18 PROCEDURE — 3008F PR BODY MASS INDEX (BMI) DOCUMENTED: ICD-10-PCS | Mod: CPTII,S$GLB,, | Performed by: PHYSICIAN ASSISTANT

## 2023-04-18 PROCEDURE — 3078F PR MOST RECENT DIASTOLIC BLOOD PRESSURE < 80 MM HG: ICD-10-PCS | Mod: CPTII,S$GLB,, | Performed by: PHYSICIAN ASSISTANT

## 2023-04-18 PROCEDURE — 1160F RVW MEDS BY RX/DR IN RCRD: CPT | Mod: CPTII,S$GLB,, | Performed by: PHYSICIAN ASSISTANT

## 2023-04-18 RX ORDER — TESTOSTERONE CYPIONATE 200 MG/ML
200 INJECTION, SOLUTION INTRAMUSCULAR
COMMUNITY
Start: 2023-03-13 | End: 2023-06-29

## 2023-04-18 RX ORDER — TADALAFIL 5 MG/1
5 TABLET ORAL DAILY PRN
Qty: 90 TABLET | Refills: 3 | Status: SHIPPED | OUTPATIENT
Start: 2023-04-18 | End: 2023-10-02 | Stop reason: SDUPTHER

## 2023-04-18 NOTE — PROGRESS NOTES
Subjective     Patient ID: Antoine Valdez is a 33 y.o. male.    Chief Complaint: Establish Care    Patient is a 34 yo male coming in to establish care with me. He voices no acute complaints.     Patient Active Problem List:     Ulcerative colitis     Septic arthritis of knee, left     Severe sepsis     Anxiety     Hyponatremia     RC (acute kidney injury)     Anisocoria     Postoperative infection     DVT (deep venous thrombosis)     Hypothyroidism     Attention deficit disorder     Has been out of the hospital several weeks due to sepsis complications from right quad tendon repair.     Past Medical History:  No date: Allergy  No date: Asthma  No date: Headache(784.0)  No date: Strep throat  No date: Ulcerative colitis    Past Surgical History:  2/22/2023: CLOSURE OF WOUND; Left      Comment:  Procedure: CLOSURE, WOUND;  Surgeon: Dante Moulton MD;  Location: [facility] OR;  Service:                Orthopedics;  Laterality: Left;  2/22/2023: INCISION AND DRAINAGE; Left      Comment:  Procedure: Incision and Drainage-Knee;  Surgeon: Dante Moulton MD;  Location: [facility] OR;  Service:                Orthopedics;  Laterality: Left;  2/8/2023: INCISION AND DRAINAGE OF KNEE; Left      Comment:  Procedure: INCISION AND DRAINAGE, KNEE;  Surgeon: Dante Moulton MD;  Location: [facility] OR;  Service:                Orthopedics;  Laterality: Left;  2/10/2023: IRRIGATION AND DEBRIDEMENT; Left      Comment:  Procedure: IRRIGATION AND DEBRIDEMENT LEFT HIP & KNEE W/               WOUND VAC PLACEMENT;  Surgeon: Dante Moulton MD;                 Location: [facility] OR;  Service: Orthopedics;  Laterality:                Left;  No date: LEG SURGERY    Review of patient's family history indicates:      Social History    Socioeconomic History      Marital status:     Tobacco Use      Smoking status: Never      Smokeless tobacco: Never    Substance and Sexual Activity      Alcohol  "use: No      Drug use: Never      Sexual activity: Yes        Partners: Female      Review of patient's allergies indicates:   -- Morphine -- Itching    Current Outpatient Medications: ·  dextroamphetamine-amphetamine (ADDERALL XR) 30 MG 24 hr capsule, Take 30 mg by mouth 2 (two) times a day., Disp: , Rfl: ·  levothyroxine (SYNTHROID) 88 MCG tablet, Take 88 mcg by mouth before breakfast., Disp: , Rfl: ·  traMADoL (ULTRAM) 50 mg tablet, Take 50 mg by mouth every 6 (six) hours as needed for Pain. Indications: pain, Disp: , Rfl: ·  tadalafiL (CIALIS) 5 MG tablet, Take 1 tablet (5 mg total) by mouth daily as needed for Erectile Dysfunction., Disp: 90 tablet, Rfl: 3·  testosterone cypionate (DEPOTESTOTERONE CYPIONATE) 200 mg/mL injection, Inject 200 mg into the muscle every 7 days., Disp: , Rfl:     /70   Pulse 94   Ht 5' 10" (1.778 m)   Wt 100.3 kg (221 lb 1.9 oz)   SpO2 97%   BMI 31.73 kg/m²          Review of Systems   Constitutional:  Negative for activity change, diaphoresis, fatigue and fever.   HENT: Negative.     Eyes: Negative.    Respiratory:  Negative for chest tightness, shortness of breath and wheezing.    Cardiovascular:  Negative for chest pain, palpitations and leg swelling.   Gastrointestinal:  Negative for abdominal pain, blood in stool, constipation and nausea.   Endocrine: Negative.    Genitourinary: Negative.  Positive for erectile dysfunction.   Musculoskeletal: Negative.    Integumentary:  Negative.   Neurological:  Negative for dizziness, seizures and headaches.   Hematological:  Negative for adenopathy. Does not bruise/bleed easily.        Objective     Physical Exam  Vitals reviewed.   Constitutional:       General: He is not in acute distress.     Appearance: Normal appearance. He is not ill-appearing, toxic-appearing or diaphoretic.   HENT:      Head: Normocephalic and atraumatic.   Cardiovascular:      Rate and Rhythm: Normal rate and regular rhythm.      Pulses: Normal pulses.     "  Heart sounds: Normal heart sounds. No murmur heard.    No friction rub. No gallop.   Pulmonary:      Effort: Pulmonary effort is normal. No respiratory distress.      Breath sounds: Normal breath sounds. No stridor. No wheezing, rhonchi or rales.   Chest:      Chest wall: No tenderness.   Abdominal:      General: There is no distension.      Palpations: Abdomen is soft.   Skin:     General: Skin is dry.      Coloration: Skin is not jaundiced.   Neurological:      Mental Status: He is alert.   Psychiatric:         Mood and Affect: Mood normal.          Assessment and Plan     Problem List Items Addressed This Visit       Hypothyroidism    Relevant Orders    TSH    Attention deficit disorder - Primary     Other Visit Diagnoses       Wellness examination        Relevant Orders    Comprehensive Metabolic Panel    CBC Auto Differential    Lipid Panel    Hemoglobin A1C    Erectile dysfunction, unspecified erectile dysfunction type        Relevant Orders    PSA, Screening            Attention deficit disorder, unspecified hyperactivity presence    Hypothyroidism, unspecified type  -     TSH; Future; Expected date: 04/18/2023    Wellness examination  -     Comprehensive Metabolic Panel; Future; Expected date: 04/18/2023  -     CBC Auto Differential; Future; Expected date: 04/18/2023  -     Lipid Panel; Future; Expected date: 04/18/2023  -     Hemoglobin A1C; Future; Expected date: 04/18/2023    Erectile dysfunction, unspecified erectile dysfunction type  -     PSA, Screening; Future; Expected date: 04/18/2023    Other orders  -     tadalafiL (CIALIS) 5 MG tablet; Take 1 tablet (5 mg total) by mouth daily as needed for Erectile Dysfunction.  Dispense: 90 tablet; Refill: 3     I spent 30 minutes on this encounter, time includes face-to-face, chart review, documentation, test review and orders.

## 2023-04-20 ENCOUNTER — PATIENT MESSAGE (OUTPATIENT)
Dept: INFECTIOUS DISEASES | Facility: CLINIC | Age: 33
End: 2023-04-20
Payer: COMMERCIAL

## 2023-04-21 ENCOUNTER — LAB VISIT (OUTPATIENT)
Dept: LAB | Facility: HOSPITAL | Age: 33
End: 2023-04-21
Attending: PHYSICIAN ASSISTANT
Payer: COMMERCIAL

## 2023-04-21 DIAGNOSIS — E03.9 HYPOTHYROIDISM, UNSPECIFIED TYPE: ICD-10-CM

## 2023-04-21 DIAGNOSIS — Z00.00 WELLNESS EXAMINATION: ICD-10-CM

## 2023-04-21 DIAGNOSIS — N52.9 ERECTILE DYSFUNCTION, UNSPECIFIED ERECTILE DYSFUNCTION TYPE: ICD-10-CM

## 2023-04-21 LAB
ALBUMIN SERPL BCP-MCNC: 3.9 G/DL (ref 3.5–5.2)
ALP SERPL-CCNC: 42 U/L (ref 55–135)
ALT SERPL W/O P-5'-P-CCNC: 40 U/L (ref 10–44)
ANION GAP SERPL CALC-SCNC: 8 MMOL/L (ref 8–16)
AST SERPL-CCNC: 38 U/L (ref 10–40)
BASOPHILS # BLD AUTO: 0.05 K/UL (ref 0–0.2)
BASOPHILS NFR BLD: 0.7 % (ref 0–1.9)
BILIRUB SERPL-MCNC: 0.4 MG/DL (ref 0.1–1)
BUN SERPL-MCNC: 18 MG/DL (ref 6–20)
CALCIUM SERPL-MCNC: 9.2 MG/DL (ref 8.7–10.5)
CHLORIDE SERPL-SCNC: 105 MMOL/L (ref 95–110)
CO2 SERPL-SCNC: 23 MMOL/L (ref 23–29)
COMPLEXED PSA SERPL-MCNC: 0.56 NG/ML (ref 0–4)
CREAT SERPL-MCNC: 1.1 MG/DL (ref 0.5–1.4)
DIFFERENTIAL METHOD: ABNORMAL
EOSINOPHIL # BLD AUTO: 0.2 K/UL (ref 0–0.5)
EOSINOPHIL NFR BLD: 2.2 % (ref 0–8)
ERYTHROCYTE [DISTWIDTH] IN BLOOD BY AUTOMATED COUNT: 14.2 % (ref 11.5–14.5)
EST. GFR  (NO RACE VARIABLE): >60 ML/MIN/1.73 M^2
ESTIMATED AVG GLUCOSE: 88 MG/DL (ref 68–131)
GLUCOSE SERPL-MCNC: 80 MG/DL (ref 70–110)
HBA1C MFR BLD: 4.7 % (ref 4–5.6)
HCT VFR BLD AUTO: 49.4 % (ref 40–54)
HGB BLD-MCNC: 15.6 G/DL (ref 14–18)
IMM GRANULOCYTES # BLD AUTO: 0.03 K/UL (ref 0–0.04)
IMM GRANULOCYTES NFR BLD AUTO: 0.4 % (ref 0–0.5)
LYMPHOCYTES # BLD AUTO: 1.4 K/UL (ref 1–4.8)
LYMPHOCYTES NFR BLD: 21.1 % (ref 18–48)
MCH RBC QN AUTO: 26.9 PG (ref 27–31)
MCHC RBC AUTO-ENTMCNC: 31.6 G/DL (ref 32–36)
MCV RBC AUTO: 85 FL (ref 82–98)
MONOCYTES # BLD AUTO: 0.7 K/UL (ref 0.3–1)
MONOCYTES NFR BLD: 10.8 % (ref 4–15)
NEUTROPHILS # BLD AUTO: 4.4 K/UL (ref 1.8–7.7)
NEUTROPHILS NFR BLD: 64.8 % (ref 38–73)
NRBC BLD-RTO: 0 /100 WBC
PLATELET # BLD AUTO: 373 K/UL (ref 150–450)
PMV BLD AUTO: 8.8 FL (ref 9.2–12.9)
POTASSIUM SERPL-SCNC: 4.4 MMOL/L (ref 3.5–5.1)
PROT SERPL-MCNC: 7.4 G/DL (ref 6–8.4)
RBC # BLD AUTO: 5.79 M/UL (ref 4.6–6.2)
SODIUM SERPL-SCNC: 136 MMOL/L (ref 136–145)
T4 FREE SERPL-MCNC: 0.7 NG/DL (ref 0.71–1.51)
TSH SERPL DL<=0.005 MIU/L-ACNC: 5.3 UIU/ML (ref 0.4–4)
WBC # BLD AUTO: 6.83 K/UL (ref 3.9–12.7)

## 2023-04-21 PROCEDURE — 80053 COMPREHEN METABOLIC PANEL: CPT | Performed by: PHYSICIAN ASSISTANT

## 2023-04-21 PROCEDURE — 84439 ASSAY OF FREE THYROXINE: CPT | Performed by: PHYSICIAN ASSISTANT

## 2023-04-21 PROCEDURE — 84153 ASSAY OF PSA TOTAL: CPT | Performed by: PHYSICIAN ASSISTANT

## 2023-04-21 PROCEDURE — 83036 HEMOGLOBIN GLYCOSYLATED A1C: CPT | Performed by: PHYSICIAN ASSISTANT

## 2023-04-21 PROCEDURE — 84443 ASSAY THYROID STIM HORMONE: CPT | Performed by: PHYSICIAN ASSISTANT

## 2023-04-21 PROCEDURE — 36415 COLL VENOUS BLD VENIPUNCTURE: CPT | Mod: PO | Performed by: PHYSICIAN ASSISTANT

## 2023-04-21 PROCEDURE — 85025 COMPLETE CBC W/AUTO DIFF WBC: CPT | Performed by: PHYSICIAN ASSISTANT

## 2023-04-23 ENCOUNTER — PATIENT MESSAGE (OUTPATIENT)
Dept: PRIMARY CARE CLINIC | Facility: CLINIC | Age: 33
End: 2023-04-23
Payer: COMMERCIAL

## 2023-05-02 RX ORDER — DEXTROAMPHETAMINE SACCHARATE, AMPHETAMINE ASPARTATE MONOHYDRATE, DEXTROAMPHETAMINE SULFATE AND AMPHETAMINE SULFATE 7.5; 7.5; 7.5; 7.5 MG/1; MG/1; MG/1; MG/1
30 CAPSULE, EXTENDED RELEASE ORAL 2 TIMES DAILY
Qty: 60 CAPSULE | Refills: 0 | Status: SHIPPED | OUTPATIENT
Start: 2023-05-02 | End: 2023-05-24 | Stop reason: SDUPTHER

## 2023-05-02 RX ORDER — LEVOTHYROXINE SODIUM 100 UG/1
100 TABLET ORAL
Qty: 90 TABLET | Refills: 1 | Status: SHIPPED | OUTPATIENT
Start: 2023-05-02 | End: 2023-05-02 | Stop reason: SDUPTHER

## 2023-05-02 RX ORDER — DEXTROAMPHETAMINE SACCHARATE, AMPHETAMINE ASPARTATE MONOHYDRATE, DEXTROAMPHETAMINE SULFATE AND AMPHETAMINE SULFATE 7.5; 7.5; 7.5; 7.5 MG/1; MG/1; MG/1; MG/1
30 CAPSULE, EXTENDED RELEASE ORAL 2 TIMES DAILY
Qty: 60 CAPSULE | Refills: 0 | Status: SHIPPED | OUTPATIENT
Start: 2023-05-02 | End: 2023-05-02 | Stop reason: SDUPTHER

## 2023-05-02 RX ORDER — LEVOTHYROXINE SODIUM 100 UG/1
100 TABLET ORAL
Qty: 90 TABLET | Refills: 1 | Status: SHIPPED | OUTPATIENT
Start: 2023-05-02 | End: 2023-10-19

## 2023-05-05 RX ORDER — LINEZOLID 600 MG/1
600 TABLET, FILM COATED ORAL EVERY 12 HOURS
Qty: 20 TABLET | Refills: 0 | Status: ON HOLD | OUTPATIENT
Start: 2023-05-05 | End: 2023-05-18 | Stop reason: HOSPADM

## 2023-05-05 NOTE — TELEPHONE ENCOUNTER
Sent in 10 more days. Recommend to follow up with Orthopedics if not better next week. Go to the ER if worse.

## 2023-05-10 ENCOUNTER — LAB VISIT (OUTPATIENT)
Dept: LAB | Facility: HOSPITAL | Age: 33
End: 2023-05-10
Payer: COMMERCIAL

## 2023-05-10 DIAGNOSIS — T81.30XA WOUND DISRUPTION: Primary | ICD-10-CM

## 2023-05-10 LAB
CRP SERPL-MCNC: 1.5 MG/L (ref 0–8.2)
ERYTHROCYTE [SEDIMENTATION RATE] IN BLOOD BY PHOTOMETRIC METHOD: 16 MM/HR (ref 0–23)

## 2023-05-10 PROCEDURE — 86140 C-REACTIVE PROTEIN: CPT | Performed by: SPECIALIST

## 2023-05-10 PROCEDURE — 85651 RBC SED RATE NONAUTOMATED: CPT | Mod: PO | Performed by: SPECIALIST

## 2023-05-10 PROCEDURE — 36415 COLL VENOUS BLD VENIPUNCTURE: CPT | Mod: PO | Performed by: SPECIALIST

## 2023-05-12 ENCOUNTER — PATIENT MESSAGE (OUTPATIENT)
Dept: INFECTIOUS DISEASES | Facility: CLINIC | Age: 33
End: 2023-05-12
Payer: COMMERCIAL

## 2023-05-12 PROBLEM — R19.7 DIARRHEA: Status: ACTIVE | Noted: 2023-05-12

## 2023-05-12 NOTE — TELEPHONE ENCOUNTER
Called and spoke to patient. He has been continuing to follow with Ortho Dr. Perez since last being seen in our office. Reports continued and different areas of draining lesions around his knee that have been off and on since discontinuing IV Ancef 2 months ago. He has been getting repeated rounds of Linezolid since then which temporarily improve the spots, but within several days of discontinuing he develops a new spot with purulent drainage.    Dr. Perez reportedly attempted to order MRI of the knee but this was denied by [employer].     His knee was recently cultured by Ortho and he reports the culture showed Staph, so he was prescribed Bactrim today. He is having increased pain, swelling, and erythema to his knee worse in the last week. Denies fevers or chills at the moment.    Given this history, there is reasonable concern for deeper space involvement. I would not expect Bactrim to treat a Staph infection that Linezolid did not work for, since Linezolid has less reported resistance than Bactrim and has excellent oral bioavailability.    I agree that MRI of the knee/thigh is warranted for further evaluation of extension of infection. Given patient's concerns and worsening symptoms, I advised he consider evaluation in the ER where hopefully imaging can be performed.    Called and spoke to charge RN in the ER.    Landy Mathews PA-C  Infectious Diseases  [facility]

## 2023-05-15 PROBLEM — L08.9 SOFT TISSUE INFECTION: Status: ACTIVE | Noted: 2023-05-15

## 2023-05-16 PROBLEM — R19.7 DIARRHEA: Status: RESOLVED | Noted: 2023-05-12 | Resolved: 2023-05-16

## 2023-05-22 ENCOUNTER — LAB VISIT (OUTPATIENT)
Dept: LAB | Facility: HOSPITAL | Age: 33
End: 2023-05-22
Payer: COMMERCIAL

## 2023-05-22 DIAGNOSIS — T81.30XA WOUND DISRUPTION: Primary | ICD-10-CM

## 2023-05-22 LAB
CRP SERPL-MCNC: 0.9 MG/L (ref 0–8.2)
ERYTHROCYTE [SEDIMENTATION RATE] IN BLOOD BY PHOTOMETRIC METHOD: 8 MM/HR (ref 0–23)

## 2023-05-22 PROCEDURE — 36415 COLL VENOUS BLD VENIPUNCTURE: CPT | Mod: PO | Performed by: SPECIALIST

## 2023-05-22 PROCEDURE — 85652 RBC SED RATE AUTOMATED: CPT | Performed by: SPECIALIST

## 2023-05-22 PROCEDURE — 86140 C-REACTIVE PROTEIN: CPT | Performed by: SPECIALIST

## 2023-05-24 NOTE — TELEPHONE ENCOUNTER
----- Message from Monse Israel sent at 5/24/2023  1:41 PM CDT -----  Regarding: Needs prior auth for script  Type: Needs Medical Advice  Who Called:  Antoine    Pharmacy name and phone #:    [facility] - [place], [place] - [address]  [address] [facility] [address]  Phone: [phone] Fax: [phone]        Best Call Back Number: [phone]    Additional Information: pharm told the pt that they need prior auth from the office for his dextroamphetamine-amphetamine (ADDERALL XR) 30 MG 24 hr capsule 60 capsule 0 5/2/2023    Sig - Route: Take 1 capsule (30 mg total) by mouth 2 (two) times a day. - Oral   Sent to pharmacy as: dextroamphetamine-amphetamine (ADDERALL XR) 30 MG 24 hr capsule   Earliest Fill Date: 5/2/2023   E-Prescribing Status: Receipt confirmed by pharmacy (5/2/2023 11:36 AM CDT)       Please call to advise.

## 2023-05-25 ENCOUNTER — TELEPHONE (OUTPATIENT)
Dept: INFECTIOUS DISEASES | Facility: CLINIC | Age: 33
End: 2023-05-25

## 2023-05-25 ENCOUNTER — OFFICE VISIT (OUTPATIENT)
Dept: PRIMARY CARE CLINIC | Facility: CLINIC | Age: 33
End: 2023-05-25
Payer: COMMERCIAL

## 2023-05-25 DIAGNOSIS — F98.8 ATTENTION DEFICIT DISORDER, UNSPECIFIED HYPERACTIVITY PRESENCE: ICD-10-CM

## 2023-05-25 DIAGNOSIS — M00.9 PYOGENIC ARTHRITIS OF LEFT KNEE JOINT, DUE TO UNSPECIFIED ORGANISM: Primary | ICD-10-CM

## 2023-05-25 PROCEDURE — 3044F HG A1C LEVEL LT 7.0%: CPT | Mod: CPTII,S$GLB,, | Performed by: PHYSICIAN ASSISTANT

## 2023-05-25 PROCEDURE — 99212 PR OFFICE/OUTPT VISIT, EST, LEVL II, 10-19 MIN: ICD-10-PCS | Mod: S$GLB,,, | Performed by: PHYSICIAN ASSISTANT

## 2023-05-25 PROCEDURE — 99212 OFFICE O/P EST SF 10 MIN: CPT | Mod: S$GLB,,, | Performed by: PHYSICIAN ASSISTANT

## 2023-05-25 PROCEDURE — 3044F PR MOST RECENT HEMOGLOBIN A1C LEVEL <7.0%: ICD-10-PCS | Mod: CPTII,S$GLB,, | Performed by: PHYSICIAN ASSISTANT

## 2023-05-25 RX ORDER — DEXTROAMPHETAMINE SACCHARATE, AMPHETAMINE ASPARTATE MONOHYDRATE, DEXTROAMPHETAMINE SULFATE AND AMPHETAMINE SULFATE 7.5; 7.5; 7.5; 7.5 MG/1; MG/1; MG/1; MG/1
30 CAPSULE, EXTENDED RELEASE ORAL 2 TIMES DAILY
Qty: 60 CAPSULE | Refills: 0 | Status: SHIPPED | OUTPATIENT
Start: 2023-05-25 | End: 2023-05-25

## 2023-05-25 RX ORDER — DEXTROAMPHETAMINE SACCHARATE, AMPHETAMINE ASPARTATE MONOHYDRATE, DEXTROAMPHETAMINE SULFATE AND AMPHETAMINE SULFATE 7.5; 7.5; 7.5; 7.5 MG/1; MG/1; MG/1; MG/1
30 CAPSULE, EXTENDED RELEASE ORAL 2 TIMES DAILY
Qty: 60 CAPSULE | Refills: 0 | Status: SHIPPED | OUTPATIENT
Start: 2023-05-25 | End: 2023-05-30 | Stop reason: SDUPTHER

## 2023-05-25 NOTE — PROGRESS NOTES
Subjective     Patient ID: Antoine Valdez is a 33 y.o. male.    Chief Complaint: Follow-up    The patient location is: [place], [place]  The chief complaint leading to consultation is: Hospital follow up    Visit type: audio only    Face to Face time with patient: 11 minutes of total time spent on the encounter, which includes face to face time and non-face to face time preparing to see the patient (eg, review of tests), Obtaining and/or reviewing separately obtained history, Documenting clinical information in the electronic or other health record, Independently interpreting results (not separately reported) and communicating results to the patient/family/caregiver, or Care coordination (not separately reported).         Each patient to whom he or she provides medical services by telemedicine is:  (1) informed of the relationship between the physician and patient and the respective role of any other health care provider with respect to management of the patient; and (2) notified that he or she may decline to receive medical services by telemedicine and may withdraw from such care at any time.    Notes: HPI:   This is a 33-year-old male brought in by his wife with a concern of generalized ache left knee pain with past draining from the wound, patient has a history of 5 surgery/procedure that was done on this knee that was between February and March, he had been admitted for sepsis secondary to septic knee, he was managed and followed by ID, he was not p.o. antibiotics, but with increased pain, and drainage, he spoke with his Infectious Disease, who recommended patient to come to the ER, patient also reported that he had diarrhea for about 5 days, with left lower quadrant abdominal pain, patient known case of ulcerative colitis, he is following up with Gastroenterology, also patient has a history of recurrent C diff required fecal transplant, telemedicine was consulted for admission              * No surgery found *        Hospital Course:   33-year-old man with a history of workplace injury to his left knee followed by quadriceps tendon repair who experience redness, pain, and warmth with associated fevers and chills.  He is admitted the hospital for IV antibiotics, Infectious Disease, and Orthopedic surgery evaluation.  MRI of the left knee shows subcutaneous edema and fluid without joint effusion, acute osseous abnormality, or evidence of osteomyelitis. Blood culture show no growth to date.  Per discussion from Orthopedic surgery and Infectious Disease, the patient will discharged home complete a total 14 days of IV daptomycin.  This will be provided via home infusion.  After completion of IV antibiotics, the patient will be seen by Orthopedic surgery in the clinic and evaluated for meniscal repair and other surgical intervention.  He is able to ambulate to the bathroom.  His pain is reasonably controlled with oral medications.  He prefers to discharge with extended well IV and is comfortable receiving IV antibiotics at home.  He will receive a dose of IV daptomycin before discharging home later today.  He will follow up with Orthopedic surgery in the clinic.       Follow-up  Pertinent negatives include no chest pain, fatigue or fever.   Review of Systems   Constitutional:  Negative for activity change, appetite change, fatigue and fever.   Respiratory:  Negative for chest tightness.    Cardiovascular:  Negative for chest pain.        Objective     Physical Exam       Assessment and Plan     Problem List Items Addressed This Visit       Septic arthritis of knee, left - Primary    Attention deficit disorder       Pyogenic arthritis of left knee joint, due to unspecified organism  Recommend to follow up with Orthropedics    Attention deficit disorder, unspecified hyperactivity presence  Stable and controlled. Continue current treatment plan as previously prescribed with your PCP.      Other orders  -      dextroamphetamine-amphetamine (ADDERALL XR) 30 MG 24 hr capsule; Take 1 capsule (30 mg total) by mouth 2 (two) times daily.  Dispense: 60 capsule; Refill: 0

## 2023-05-25 NOTE — TELEPHONE ENCOUNTER
----- Message from Jing Roman sent at 5/25/2023  8:37 AM CDT -----  Regarding: patient portal down for appointment-call requested  Contact: patient at [phone]  Type: patient portal down for appointment-call requested  Who Called:  patient    Best Call Back Number: [phone]    Additional Information: patient was trying to do today's virtual appointment but was informed that the virtual chart was down. He wants to know if you could call for today's appointment. Please call and advise. Thank you

## 2023-05-26 NOTE — TELEPHONE ENCOUNTER
----- Message from Zaida Jeffery sent at 5/25/2023  2:41 PM CDT -----  Contact: pt  1st Call Pharm dappoie is ending tomorrow they want to should they continue or what   Please give them a call back [phone]     
Routed to Dr. Oliver   
Spoke with Dr. Oliver - PETEY dapto and pull PICC line.    Spoke with patient, his wife is a nurse and was discussed at time of dc from hospital that he would not need HH as she could tend his PICC, therefore she will pull his PICC.    Called [facility] [phone], spoke with Andrés, verbal orders to DC Dapto. given  
Adequate: hears normal conversation without difficulty

## 2023-05-29 PROBLEM — N17.9 AKI (ACUTE KIDNEY INJURY): Status: RESOLVED | Noted: 2023-02-08 | Resolved: 2023-05-29

## 2023-05-29 PROBLEM — A41.9 SEVERE SEPSIS: Status: RESOLVED | Noted: 2023-02-08 | Resolved: 2023-05-29

## 2023-05-29 PROBLEM — R65.20 SEVERE SEPSIS: Status: RESOLVED | Noted: 2023-02-08 | Resolved: 2023-05-29

## 2023-05-30 ENCOUNTER — TELEPHONE (OUTPATIENT)
Dept: PRIMARY CARE CLINIC | Facility: CLINIC | Age: 33
End: 2023-05-30
Payer: COMMERCIAL

## 2023-05-30 RX ORDER — DEXTROAMPHETAMINE SACCHARATE, AMPHETAMINE ASPARTATE MONOHYDRATE, DEXTROAMPHETAMINE SULFATE AND AMPHETAMINE SULFATE 7.5; 7.5; 7.5; 7.5 MG/1; MG/1; MG/1; MG/1
30 CAPSULE, EXTENDED RELEASE ORAL 2 TIMES DAILY
Qty: 60 CAPSULE | Refills: 0 | Status: SHIPPED | OUTPATIENT
Start: 2023-05-30 | End: 2023-05-30

## 2023-05-30 RX ORDER — DEXTROAMPHETAMINE SACCHARATE, AMPHETAMINE ASPARTATE, DEXTROAMPHETAMINE SULFATE AND AMPHETAMINE SULFATE 7.5; 7.5; 7.5; 7.5 MG/1; MG/1; MG/1; MG/1
30 TABLET ORAL 2 TIMES DAILY
Qty: 60 TABLET | Refills: 0 | Status: SHIPPED | OUTPATIENT
Start: 2023-05-30 | End: 2023-06-27 | Stop reason: SDUPTHER

## 2023-05-30 NOTE — TELEPHONE ENCOUNTER
----- Message from Reta Villela sent at 5/30/2023  2:51 PM CDT -----  Contact: patient  Type:  Needs Medical Advice    Who Called:  Patient     Would the patient rather a call back or a response via [facility]? Call     Best Call Back Number: [phone] (home)      Additional Information: Patient would like the Adderall prescription sent to [facility] in [place].     Please call to advise

## 2023-05-30 NOTE — TELEPHONE ENCOUNTER
----- Message from Claudia Reyes, Patient Care Assistant sent at 5/30/2023  3:51 PM CDT -----  Contact: self  Pt is calling to speak a nurse in regards to the wrong med dextroamphetamine-amphetamine (ADDERALL XR) 30 MG 24 hr capsule, he says he get the regular one [phone]  thanks

## 2023-06-27 ENCOUNTER — PATIENT MESSAGE (OUTPATIENT)
Dept: PRIMARY CARE CLINIC | Facility: CLINIC | Age: 33
End: 2023-06-27
Payer: COMMERCIAL

## 2023-06-27 RX ORDER — DEXTROAMPHETAMINE SACCHARATE, AMPHETAMINE ASPARTATE, DEXTROAMPHETAMINE SULFATE AND AMPHETAMINE SULFATE 7.5; 7.5; 7.5; 7.5 MG/1; MG/1; MG/1; MG/1
30 TABLET ORAL 2 TIMES DAILY
Qty: 60 TABLET | Refills: 0 | Status: CANCELLED | OUTPATIENT
Start: 2023-06-27

## 2023-06-27 RX ORDER — DEXTROAMPHETAMINE SACCHARATE, AMPHETAMINE ASPARTATE, DEXTROAMPHETAMINE SULFATE AND AMPHETAMINE SULFATE 7.5; 7.5; 7.5; 7.5 MG/1; MG/1; MG/1; MG/1
30 TABLET ORAL 2 TIMES DAILY
Qty: 60 TABLET | Refills: 0 | Status: SHIPPED | OUTPATIENT
Start: 2023-06-27 | End: 2023-06-27

## 2023-06-27 RX ORDER — DEXTROAMPHETAMINE SACCHARATE, AMPHETAMINE ASPARTATE MONOHYDRATE, DEXTROAMPHETAMINE SULFATE AND AMPHETAMINE SULFATE 7.5; 7.5; 7.5; 7.5 MG/1; MG/1; MG/1; MG/1
30 CAPSULE, EXTENDED RELEASE ORAL 2 TIMES DAILY
Qty: 60 CAPSULE | Refills: 0 | Status: SHIPPED | OUTPATIENT
Start: 2023-06-27 | End: 2023-06-27 | Stop reason: SDUPTHER

## 2023-06-27 RX ORDER — DEXTROAMPHETAMINE SACCHARATE, AMPHETAMINE ASPARTATE MONOHYDRATE, DEXTROAMPHETAMINE SULFATE AND AMPHETAMINE SULFATE 7.5; 7.5; 7.5; 7.5 MG/1; MG/1; MG/1; MG/1
30 CAPSULE, EXTENDED RELEASE ORAL 2 TIMES DAILY
Qty: 60 CAPSULE | Refills: 0 | Status: SHIPPED | OUTPATIENT
Start: 2023-06-27 | End: 2023-07-31 | Stop reason: SDUPTHER

## 2023-06-27 NOTE — TELEPHONE ENCOUNTER
----- Message from Casey Rodriguez sent at 6/27/2023 10:56 AM CDT -----  Contact: pt  Type: Needs Medical Advice  Who Called:  pt     Pharmacy name and phone #:        [facility] - [place] - [address] [facility] [address]  [address] [facility] [address]  [place] [address]  Phone: [phone] Fax: [phone]      Best Call Back Number: [phone]    Additional Information: pt states he would like his dextroamphetamine-amphetamine (ADDERALL) 30 mg Tab sent to pharmacy above he states he is completely out. Please advise.

## 2023-06-29 DIAGNOSIS — E29.1 HYPOGONADISM IN MALE: Primary | ICD-10-CM

## 2023-06-29 RX ORDER — TESTOSTERONE CYPIONATE 200 MG/ML
INJECTION, SOLUTION INTRAMUSCULAR
Qty: 10 ML | Refills: 1 | Status: SHIPPED | OUTPATIENT
Start: 2023-06-29 | End: 2023-10-05

## 2023-07-31 NOTE — TELEPHONE ENCOUNTER
----- Message from Bree Sheridan, Patient Care Assistant sent at 7/31/2023  1:37 PM CDT -----  Regarding: refill  Contact: pt  Type:  RX Refill Request    Who Called:  pt   Refill or New Rx:  refill   RX Name and Strength:  dextroamphetamine-amphetamine (ADDERALL XR) 30 MG 24 hr capsule    How is the patient currently taking it? 2xday   Is this a 30 day or 90 day RX:   30     Preferred Pharmacy with phone number:    [facility] [address]  Phone: [phone] Fax: [phone]    Local or Mail Order:  local   Ordering Provider:  Shraddha Metzger Call Back Number:  [phone] (home)     Additional Information:  please call pt to advise. Thanks!

## 2023-08-01 RX ORDER — DEXTROAMPHETAMINE SACCHARATE, AMPHETAMINE ASPARTATE MONOHYDRATE, DEXTROAMPHETAMINE SULFATE AND AMPHETAMINE SULFATE 7.5; 7.5; 7.5; 7.5 MG/1; MG/1; MG/1; MG/1
30 CAPSULE, EXTENDED RELEASE ORAL 2 TIMES DAILY
Qty: 60 CAPSULE | Refills: 0 | Status: SHIPPED | OUTPATIENT
Start: 2023-08-01 | End: 2023-09-06 | Stop reason: SDUPTHER

## 2023-08-29 RX ORDER — CEPHALEXIN 500 MG/1
500 CAPSULE ORAL EVERY 8 HOURS
Qty: 21 CAPSULE | Refills: 0 | Status: SHIPPED | OUTPATIENT
Start: 2023-08-29 | End: 2023-10-02

## 2023-08-30 ENCOUNTER — TELEPHONE (OUTPATIENT)
Dept: GASTROENTEROLOGY | Facility: CLINIC | Age: 33
End: 2023-08-30
Payer: COMMERCIAL

## 2023-08-30 NOTE — TELEPHONE ENCOUNTER
Left message for patient to return call. Need to cancel or reschedule his appointment  as nothing was done/scheduled from his previous appointment.

## 2023-09-06 RX ORDER — DEXTROAMPHETAMINE SACCHARATE, AMPHETAMINE ASPARTATE MONOHYDRATE, DEXTROAMPHETAMINE SULFATE AND AMPHETAMINE SULFATE 7.5; 7.5; 7.5; 7.5 MG/1; MG/1; MG/1; MG/1
30 CAPSULE, EXTENDED RELEASE ORAL 2 TIMES DAILY
Qty: 60 CAPSULE | Refills: 0 | Status: CANCELLED | OUTPATIENT
Start: 2023-09-06

## 2023-09-06 NOTE — TELEPHONE ENCOUNTER
----- Message from Paula Viramontes sent at 9/6/2023  8:20 AM CDT -----  Contact: patient  Type:  RX Refill Request    Who Called:  patient  Refill or New Rx:  refill  RX Name and Strength:  dextroamphetamine-amphetamine (ADDERALL XR) 30 MG 24 hr capsule  How is the patient currently taking it? (ex. 1XDay):  as directed  Is this a 30 day or 90 day RX:  30  Preferred Pharmacy with phone number:    [facility] [address] [address]  Phone: [phone] Fax: [phone]  Local or Mail Order:  local  Ordering Provider:  gabby Metzger Call Back Number:  [phone] (home)   Additional Information:

## 2023-09-06 NOTE — TELEPHONE ENCOUNTER
----- Message from Funmi Moctezuma sent at 9/6/2023  1:00 PM CDT -----  Contact: Patient  Type:  RX Refill Request     Who Called:  patient  Refill or New Rx:  refill  RX Name and Strength:  dextroamphetamine-amphetamine (ADDERALL XR) 30 MG 24 hr capsule  How is the patient currently taking it? (ex. 1XDay):  as directed  Is this a 30 day or 90 day RX:  30  Preferred Pharmacy with phone number:    [facility] [address]  Phone: [phone] Fax: [phone]  Local or Mail Order:  local  Ordering Provider:  gabby Metzger Call Back Number:  [phone] (home)   Additional Information:  Please call the patient back at the phone number listed above to advise. Thank you!

## 2023-09-07 RX ORDER — DEXTROAMPHETAMINE SACCHARATE, AMPHETAMINE ASPARTATE MONOHYDRATE, DEXTROAMPHETAMINE SULFATE AND AMPHETAMINE SULFATE 7.5; 7.5; 7.5; 7.5 MG/1; MG/1; MG/1; MG/1
30 CAPSULE, EXTENDED RELEASE ORAL 2 TIMES DAILY
Qty: 60 CAPSULE | Refills: 0 | Status: SHIPPED | OUTPATIENT
Start: 2023-09-07 | End: 2023-10-02 | Stop reason: SDUPTHER

## 2023-10-02 ENCOUNTER — PATIENT MESSAGE (OUTPATIENT)
Dept: PRIMARY CARE CLINIC | Facility: CLINIC | Age: 33
End: 2023-10-02
Payer: COMMERCIAL

## 2023-10-02 DIAGNOSIS — E03.9 HYPOTHYROIDISM, UNSPECIFIED TYPE: ICD-10-CM

## 2023-10-02 DIAGNOSIS — Z00.00 WELLNESS EXAMINATION: Primary | ICD-10-CM

## 2023-10-02 DIAGNOSIS — E29.1 HYPOGONADISM IN MALE: Primary | ICD-10-CM

## 2023-10-02 RX ORDER — AMOXICILLIN 875 MG/1
875 TABLET, FILM COATED ORAL EVERY 12 HOURS
Qty: 20 TABLET | Refills: 0 | Status: SHIPPED | OUTPATIENT
Start: 2023-10-02 | End: 2023-12-07

## 2023-10-02 RX ORDER — DEXTROAMPHETAMINE SACCHARATE, AMPHETAMINE ASPARTATE MONOHYDRATE, DEXTROAMPHETAMINE SULFATE AND AMPHETAMINE SULFATE 7.5; 7.5; 7.5; 7.5 MG/1; MG/1; MG/1; MG/1
30 CAPSULE, EXTENDED RELEASE ORAL 2 TIMES DAILY
Qty: 60 CAPSULE | Refills: 0 | Status: SHIPPED | OUTPATIENT
Start: 2023-10-02 | End: 2023-11-06 | Stop reason: SDUPTHER

## 2023-10-02 RX ORDER — TADALAFIL 5 MG/1
5 TABLET ORAL DAILY PRN
Qty: 90 TABLET | Refills: 3 | Status: SHIPPED | OUTPATIENT
Start: 2023-10-02 | End: 2023-12-07 | Stop reason: SDUPTHER

## 2023-10-05 DIAGNOSIS — E29.1 HYPOGONADISM IN MALE: ICD-10-CM

## 2023-10-05 RX ORDER — TESTOSTERONE CYPIONATE 200 MG/ML
INJECTION, SOLUTION INTRAMUSCULAR
Qty: 10 ML | Refills: 1 | Status: SHIPPED | OUTPATIENT
Start: 2023-10-05 | End: 2024-01-02 | Stop reason: SDUPTHER

## 2023-10-10 ENCOUNTER — LAB VISIT (OUTPATIENT)
Dept: LAB | Facility: HOSPITAL | Age: 33
End: 2023-10-10
Attending: PHYSICIAN ASSISTANT
Payer: COMMERCIAL

## 2023-10-10 DIAGNOSIS — Z00.00 WELLNESS EXAMINATION: ICD-10-CM

## 2023-10-10 DIAGNOSIS — E29.1 HYPOGONADISM IN MALE: ICD-10-CM

## 2023-10-10 DIAGNOSIS — E03.9 HYPOTHYROIDISM, UNSPECIFIED TYPE: ICD-10-CM

## 2023-10-10 LAB
ALBUMIN SERPL BCP-MCNC: 4.1 G/DL (ref 3.5–5.2)
ALP SERPL-CCNC: 36 U/L (ref 55–135)
ALT SERPL W/O P-5'-P-CCNC: 49 U/L (ref 10–44)
ANION GAP SERPL CALC-SCNC: 10 MMOL/L (ref 8–16)
AST SERPL-CCNC: 43 U/L (ref 10–40)
BASOPHILS # BLD AUTO: 0.04 K/UL (ref 0–0.2)
BASOPHILS NFR BLD: 1.1 % (ref 0–1.9)
BILIRUB SERPL-MCNC: 0.5 MG/DL (ref 0.1–1)
BUN SERPL-MCNC: 24 MG/DL (ref 6–20)
CALCIUM SERPL-MCNC: 9.2 MG/DL (ref 8.7–10.5)
CHLORIDE SERPL-SCNC: 105 MMOL/L (ref 95–110)
CO2 SERPL-SCNC: 24 MMOL/L (ref 23–29)
CREAT SERPL-MCNC: 1.3 MG/DL (ref 0.5–1.4)
DIFFERENTIAL METHOD: ABNORMAL
EOSINOPHIL # BLD AUTO: 0.2 K/UL (ref 0–0.5)
EOSINOPHIL NFR BLD: 5.1 % (ref 0–8)
ERYTHROCYTE [DISTWIDTH] IN BLOOD BY AUTOMATED COUNT: 12.4 % (ref 11.5–14.5)
EST. GFR  (NO RACE VARIABLE): >60 ML/MIN/1.73 M^2
GLUCOSE SERPL-MCNC: 87 MG/DL (ref 70–110)
HCT VFR BLD AUTO: 50 % (ref 40–54)
HGB BLD-MCNC: 16.4 G/DL (ref 14–18)
IMM GRANULOCYTES # BLD AUTO: 0.01 K/UL (ref 0–0.04)
IMM GRANULOCYTES NFR BLD AUTO: 0.3 % (ref 0–0.5)
LYMPHOCYTES # BLD AUTO: 1.2 K/UL (ref 1–4.8)
LYMPHOCYTES NFR BLD: 32.6 % (ref 18–48)
MCH RBC QN AUTO: 27.7 PG (ref 27–31)
MCHC RBC AUTO-ENTMCNC: 32.8 G/DL (ref 32–36)
MCV RBC AUTO: 84 FL (ref 82–98)
MONOCYTES # BLD AUTO: 0.5 K/UL (ref 0.3–1)
MONOCYTES NFR BLD: 13.5 % (ref 4–15)
NEUTROPHILS # BLD AUTO: 1.7 K/UL (ref 1.8–7.7)
NEUTROPHILS NFR BLD: 47.4 % (ref 38–73)
NRBC BLD-RTO: 0 /100 WBC
PLATELET # BLD AUTO: 218 K/UL (ref 150–450)
PMV BLD AUTO: 8.3 FL (ref 9.2–12.9)
POTASSIUM SERPL-SCNC: 4.2 MMOL/L (ref 3.5–5.1)
PROT SERPL-MCNC: 7 G/DL (ref 6–8.4)
RBC # BLD AUTO: 5.93 M/UL (ref 4.6–6.2)
SODIUM SERPL-SCNC: 139 MMOL/L (ref 136–145)
WBC # BLD AUTO: 3.56 K/UL (ref 3.9–12.7)

## 2023-10-10 PROCEDURE — 85025 COMPLETE CBC W/AUTO DIFF WBC: CPT | Mod: PO | Performed by: PHYSICIAN ASSISTANT

## 2023-10-10 PROCEDURE — 84153 ASSAY OF PSA TOTAL: CPT | Performed by: PHYSICIAN ASSISTANT

## 2023-10-10 PROCEDURE — 82672 ASSAY OF ESTROGEN: CPT | Performed by: PHYSICIAN ASSISTANT

## 2023-10-10 PROCEDURE — 84443 ASSAY THYROID STIM HORMONE: CPT | Performed by: PHYSICIAN ASSISTANT

## 2023-10-10 PROCEDURE — 86592 SYPHILIS TEST NON-TREP QUAL: CPT | Performed by: PHYSICIAN ASSISTANT

## 2023-10-10 PROCEDURE — 36415 COLL VENOUS BLD VENIPUNCTURE: CPT | Mod: PO | Performed by: PHYSICIAN ASSISTANT

## 2023-10-10 PROCEDURE — 80061 LIPID PANEL: CPT | Performed by: PHYSICIAN ASSISTANT

## 2023-10-10 PROCEDURE — 87389 HIV-1 AG W/HIV-1&-2 AB AG IA: CPT | Performed by: PHYSICIAN ASSISTANT

## 2023-10-10 PROCEDURE — 80053 COMPREHEN METABOLIC PANEL: CPT | Mod: PO | Performed by: PHYSICIAN ASSISTANT

## 2023-10-10 PROCEDURE — 84403 ASSAY OF TOTAL TESTOSTERONE: CPT | Performed by: PHYSICIAN ASSISTANT

## 2023-10-10 PROCEDURE — 86803 HEPATITIS C AB TEST: CPT | Performed by: PHYSICIAN ASSISTANT

## 2023-10-11 LAB
CHOLEST SERPL-MCNC: 224 MG/DL (ref 120–199)
CHOLEST/HDLC SERPL: 7.7 {RATIO} (ref 2–5)
COMPLEXED PSA SERPL-MCNC: 0.5 NG/ML (ref 0–4)
HCV AB SERPL QL IA: NORMAL
HDLC SERPL-MCNC: 29 MG/DL (ref 40–75)
HDLC SERPL: 12.9 % (ref 20–50)
HIV 1+2 AB+HIV1 P24 AG SERPL QL IA: NORMAL
LDLC SERPL CALC-MCNC: 167 MG/DL (ref 63–159)
NONHDLC SERPL-MCNC: 195 MG/DL
RPR SER QL: NORMAL
TESTOST SERPL-MCNC: >1500 NG/DL (ref 304–1227)
TRIGL SERPL-MCNC: 140 MG/DL (ref 30–150)
TSH SERPL DL<=0.005 MIU/L-ACNC: 3.32 UIU/ML (ref 0.4–4)

## 2023-10-13 DIAGNOSIS — E29.1 HYPOTESTOSTERONEMIA IN MALE: Primary | ICD-10-CM

## 2023-10-13 LAB
ESTROGEN SERPL-MCNC: 173 PG/ML
ESTROGEN SERPL-MCNC: 173 PG/ML

## 2023-10-13 RX ORDER — ANASTROZOLE 1 MG/1
1 TABLET ORAL WEEKLY
Qty: 12 TABLET | Refills: 3 | Status: ON HOLD | OUTPATIENT
Start: 2023-10-13 | End: 2024-02-20 | Stop reason: HOSPADM

## 2023-10-19 RX ORDER — LEVOTHYROXINE SODIUM 100 UG/1
100 TABLET ORAL
Qty: 30 TABLET | Refills: 1 | Status: SHIPPED | OUTPATIENT
Start: 2023-10-19 | End: 2023-12-07 | Stop reason: SDUPTHER

## 2023-11-03 ENCOUNTER — PATIENT MESSAGE (OUTPATIENT)
Dept: GASTROENTEROLOGY | Facility: CLINIC | Age: 33
End: 2023-11-03

## 2023-11-03 ENCOUNTER — TELEPHONE (OUTPATIENT)
Dept: GASTROENTEROLOGY | Facility: CLINIC | Age: 33
End: 2023-11-03
Payer: COMMERCIAL

## 2023-11-03 NOTE — TELEPHONE ENCOUNTER
----- Message from Roxie Perrin sent at 11/3/2023 11:36 AM CDT -----  Regarding: appointment  Contact: patient  Type:  Same Day Appointment Request    Caller is requesting a same day appointment.  Caller declined first available appointment listed below.      Name of Caller:  patient  When is the first available appointment?    Symptoms:  colitis  Best Call Back Number:  [phone] (home)     Additional Information:   Please call patient to schedule.  Thanks!

## 2023-11-06 ENCOUNTER — PATIENT MESSAGE (OUTPATIENT)
Dept: PRIMARY CARE CLINIC | Facility: CLINIC | Age: 33
End: 2023-11-06
Payer: COMMERCIAL

## 2023-11-06 RX ORDER — DEXTROAMPHETAMINE SACCHARATE, AMPHETAMINE ASPARTATE MONOHYDRATE, DEXTROAMPHETAMINE SULFATE AND AMPHETAMINE SULFATE 7.5; 7.5; 7.5; 7.5 MG/1; MG/1; MG/1; MG/1
30 CAPSULE, EXTENDED RELEASE ORAL 2 TIMES DAILY
Qty: 60 CAPSULE | Refills: 0 | OUTPATIENT
Start: 2023-11-06

## 2023-11-06 RX ORDER — DEXTROAMPHETAMINE SACCHARATE, AMPHETAMINE ASPARTATE MONOHYDRATE, DEXTROAMPHETAMINE SULFATE AND AMPHETAMINE SULFATE 7.5; 7.5; 7.5; 7.5 MG/1; MG/1; MG/1; MG/1
30 CAPSULE, EXTENDED RELEASE ORAL 2 TIMES DAILY
Qty: 60 CAPSULE | Refills: 0 | Status: SHIPPED | OUTPATIENT
Start: 2023-11-06 | End: 2023-12-07 | Stop reason: SDUPTHER

## 2023-11-06 NOTE — TELEPHONE ENCOUNTER
----- Message from Manohar Brandon sent at 11/6/2023  9:47 AM CST -----  Regarding: refill / pt is out of med  Contact: lacey at [phone]  Type:  RX Refill Request    Who Called:  Lacey   Refill or New Rx:  refill    RX Name and Strength:    dextroamphetamine-amphetamine (ADDERALL XR) 30 MG 24 hr capsule 60 capsule 0 10/2/2023      Sig - Route: Take 1 capsule (30 mg total) by mouth 2 (two) times a day. - Oral      Sent to pharmacy as: dextroamphetamine-amphetamine (ADDERALL XR) 30 MG 24 hr capsule      Earliest Fill Date: 10/2/2023      Notes to Pharmacy: GENERIC ONLY!!!!!!!!!!!!      E-Prescribing Status: Receipt confirmed by pharmacy (10/2/2023  3:24 PM CDT)     How is the patient currently taking it? (ex. 1XDay):  see above  Is this a 30 day or 90 day RX:  see above    [facility] number[facility] [address]  Phone: [phone] Fax: [phone]    Local or Mail Order:  local    Ordering Provider:  DANIELLA Bergman     Best Call Back Number:  [phone]    Additional Information:  pt is out of med

## 2023-11-06 NOTE — TELEPHONE ENCOUNTER
Recommend establish care at [facility] clinic (Dr Acevedo or Dr Vaca).

## 2023-11-07 ENCOUNTER — TELEPHONE (OUTPATIENT)
Dept: GASTROENTEROLOGY | Facility: CLINIC | Age: 33
End: 2023-11-07
Payer: COMMERCIAL

## 2023-11-07 RX ORDER — CIPROFLOXACIN 500 MG/1
500 TABLET ORAL 2 TIMES DAILY
Qty: 14 TABLET | Refills: 0 | Status: SHIPPED | OUTPATIENT
Start: 2023-11-07 | End: 2023-11-14

## 2023-11-07 NOTE — TELEPHONE ENCOUNTER
Angelina Waddell, RN  You 1 hour ago (3:12 PM)     YISSEL Quigley,     I will add this patient to our queue to begin our new patient process, but it may be some time before we can get her in to clinic with one of our providers.     Angelina

## 2023-11-20 ENCOUNTER — TELEPHONE (OUTPATIENT)
Dept: GASTROENTEROLOGY | Facility: CLINIC | Age: 33
End: 2023-11-20
Payer: COMMERCIAL

## 2023-12-04 DIAGNOSIS — E29.1 HYPOGONADISM IN MALE: ICD-10-CM

## 2023-12-04 RX ORDER — TESTOSTERONE CYPIONATE 200 MG/ML
INJECTION, SOLUTION INTRAMUSCULAR
Qty: 10 ML | Refills: 1 | Status: CANCELLED | OUTPATIENT
Start: 2023-12-04

## 2023-12-04 RX ORDER — DEXTROAMPHETAMINE SACCHARATE, AMPHETAMINE ASPARTATE MONOHYDRATE, DEXTROAMPHETAMINE SULFATE AND AMPHETAMINE SULFATE 7.5; 7.5; 7.5; 7.5 MG/1; MG/1; MG/1; MG/1
30 CAPSULE, EXTENDED RELEASE ORAL 2 TIMES DAILY
Qty: 60 CAPSULE | Refills: 0 | Status: CANCELLED | OUTPATIENT
Start: 2023-12-04

## 2023-12-04 NOTE — TELEPHONE ENCOUNTER
----- Message from Juana Yon sent at 12/4/2023 12:46 PM CST -----  Type:  RX Refill Request    Who Called:  pt   Refill or New Rx:  refill  RX Name and Strength:  dextroamphetamine-amphetamine (ADDERALL XR) 30 MG 24 hr capsule / testosterone cypionate (DEPOTESTOTERONE CYPIONATE) 200 mg/mL injection  How is the patient currently taking it? (ex. 1XDay):  as direct   Is this a 30 day or 90 day RX:  30   Preferred Pharmacy with phone number:    [facility]  [address]   [email or web address]  Drugstore chain selling a variety of beauty & health products, plus some grocery & household items.  Get online care: [email or web address]  Address: [address] [place] [address]  Departments: [facility] · [facility] Photo · [employer] [facility]  Hours:   Open · Closes 9 PM  Pharmacy: Closes soon · 1:30 PM · Reopens 2 PM · More hours  Order: [email or web address]     Providers   Phone: [phone]      Local or Mail Order:  local   Ordering Provider:  gabby Metzger Call Back Number:  [phone] (home)     Additional Information:  please advise

## 2023-12-04 NOTE — TELEPHONE ENCOUNTER
----- Message from Zohra Sheets sent at 12/4/2023 12:44 PM CST -----  Regarding: Refill  Type:  RX Refill Request    Who Called: pt   Refill or New Rx refill    RX Name and Strength:dextroamphetamine-amphetamine (ADDERALL XR) 30 MG 24 hr capsule  How is the patient currently taking it? (ex. 1XDay):as directed     Is this a 30 day or 90 day RX:90  Preferred Pharmacy with phone number:  [facility] [address] [address]  Phone: [phone] Fax: [phone]           Local or Mail Order:local   Ordering Provider:gabby Metzger Call Back Number:[phone] (home)     Additional Information: Requesting call back once sent to the pharmacy  please advise thank you

## 2023-12-07 ENCOUNTER — OFFICE VISIT (OUTPATIENT)
Dept: PRIMARY CARE CLINIC | Facility: CLINIC | Age: 33
End: 2023-12-07
Payer: COMMERCIAL

## 2023-12-07 DIAGNOSIS — N52.9 ERECTILE DYSFUNCTION, UNSPECIFIED ERECTILE DYSFUNCTION TYPE: ICD-10-CM

## 2023-12-07 DIAGNOSIS — E03.9 HYPOTHYROIDISM, UNSPECIFIED TYPE: Primary | ICD-10-CM

## 2023-12-07 DIAGNOSIS — F98.8 ATTENTION DEFICIT DISORDER, UNSPECIFIED HYPERACTIVITY PRESENCE: ICD-10-CM

## 2023-12-07 PROCEDURE — 99213 PR OFFICE/OUTPT VISIT, EST, LEVL III, 20-29 MIN: ICD-10-PCS | Mod: 95,,, | Performed by: PHYSICIAN ASSISTANT

## 2023-12-07 PROCEDURE — 3044F HG A1C LEVEL LT 7.0%: CPT | Mod: CPTII,95,, | Performed by: PHYSICIAN ASSISTANT

## 2023-12-07 PROCEDURE — 3044F PR MOST RECENT HEMOGLOBIN A1C LEVEL <7.0%: ICD-10-PCS | Mod: CPTII,95,, | Performed by: PHYSICIAN ASSISTANT

## 2023-12-07 PROCEDURE — 99213 OFFICE O/P EST LOW 20 MIN: CPT | Mod: 95,,, | Performed by: PHYSICIAN ASSISTANT

## 2023-12-07 RX ORDER — TADALAFIL 10 MG/1
10 TABLET ORAL DAILY PRN
Qty: 30 TABLET | Refills: 11 | Status: SHIPPED | OUTPATIENT
Start: 2023-12-07 | End: 2024-04-01 | Stop reason: SDUPTHER

## 2023-12-07 RX ORDER — LEVOTHYROXINE SODIUM 100 UG/1
100 TABLET ORAL
Qty: 90 TABLET | Refills: 3 | Status: SHIPPED | OUTPATIENT
Start: 2023-12-07 | End: 2024-04-01 | Stop reason: SDUPTHER

## 2023-12-07 RX ORDER — DEXTROAMPHETAMINE SACCHARATE, AMPHETAMINE ASPARTATE MONOHYDRATE, DEXTROAMPHETAMINE SULFATE AND AMPHETAMINE SULFATE 7.5; 7.5; 7.5; 7.5 MG/1; MG/1; MG/1; MG/1
30 CAPSULE, EXTENDED RELEASE ORAL 2 TIMES DAILY
Qty: 60 CAPSULE | Refills: 0 | Status: SHIPPED | OUTPATIENT
Start: 2023-12-07 | End: 2024-01-05 | Stop reason: SDUPTHER

## 2023-12-07 NOTE — PROGRESS NOTES
Subjective     Patient ID: Antoine Valdez is a 33 y.o. male.    Chief Complaint: No chief complaint on file.    The patient location is: [place], [place]  The chief complaint leading to consultation is: med refill    Visit type: audiovisual    Face to Face time with patient: 20 minutes of total time spent on the encounter, which includes face to face time and non-face to face time preparing to see the patient (eg, review of tests), Obtaining and/or reviewing separately obtained history, Documenting clinical information in the electronic or other health record, Independently interpreting results (not separately reported) and communicating results to the patient/family/caregiver, or Care coordination (not separately reported).         Each patient to whom he or she provides medical services by telemedicine is:  (1) informed of the relationship between the physician and patient and the respective role of any other health care provider with respect to management of the patient; and (2) notified that he or she may decline to receive medical services by telemedicine and may withdraw from such care at any time.    Notes:     Patient Active Problem List:     Ulcerative colitis     Septic arthritis of knee, left     Anxiety     Hyponatremia     Anisocoria     Postoperative infection     DVT (deep venous thrombosis)     Hypothyroidism     Attention deficit disorder     Soft tissue infection    Feels like the cialis is not strong enough, but other wise feels like things are stable     Past Medical History:  No date: Allergy  No date: Asthma  No date: Headache(784.0)  No date: Strep throat  No date: Ulcerative colitis           Review of Systems   Constitutional:  Negative for activity change and unexpected weight change.   HENT:  Negative for hearing loss and trouble swallowing.    Eyes:  Negative for discharge and visual disturbance.   Respiratory:  Negative for chest tightness and wheezing.    Cardiovascular:  Negative for  chest pain and palpitations.   Gastrointestinal:  Negative for blood in stool, constipation, diarrhea and vomiting.   Endocrine: Negative for polydipsia and polyuria.   Genitourinary:  Negative for difficulty urinating, hematuria and urgency.   Musculoskeletal:  Negative for arthralgias, joint swelling and neck pain.   Neurological:  Negative for weakness and headaches.   Psychiatric/Behavioral:  Negative for dysphoric mood.           Objective     Physical Exam  Constitutional:       General: He is not in acute distress.     Appearance: Normal appearance. He is not ill-appearing, toxic-appearing or diaphoretic.   Pulmonary:      Effort: Pulmonary effort is normal.   Neurological:      Mental Status: He is alert.   Psychiatric:         Mood and Affect: Mood normal.            Assessment and Plan     1. Hypothyroidism, unspecified type  -     levothyroxine (SYNTHROID) 100 MCG tablet; Take 1 tablet (100 mcg total) by mouth before breakfast.  Dispense: 90 tablet; Refill: 3    2. Attention deficit disorder, unspecified hyperactivity presence  -     dextroamphetamine-amphetamine (ADDERALL XR) 30 MG 24 hr capsule; Take 1 capsule (30 mg total) by mouth 2 (two) times a day.  Dispense: 60 capsule; Refill: 0    3. Erectile dysfunction, unspecified erectile dysfunction type  -     tadalafiL (CIALIS) 10 MG tablet; Take 1 tablet (10 mg total) by mouth daily as needed for Erectile Dysfunction.  Dispense: 30 tablet; Refill: 11        I spent 30 minutes on this encounter, time includes face-to-face, chart review, documentation, test review and orders.           Fu 6 months

## 2023-12-07 NOTE — PATIENT INSTRUCTIONS
Oniel Schultz,     If you are due for any health screening(s) below please notify me so we can arrange them to be ordered and scheduled. Most healthy patients at your age complete them, but you are free to accept or refuse.     If you can't do it, I'll definitely understand. If you can, I'd certainly appreciate it!    All of your core healthy metrics are met.                   Please have Marilyn check you blood pressure and let me know the results    Charly

## 2023-12-07 NOTE — PROGRESS NOTES
Answers submitted by the patient for this visit:  Review of Systems Questionnaire (Submitted on 12/7/2023)  activity change: No  unexpected weight change: No  neck pain: No  hearing loss: No  trouble swallowing: No  eye discharge: No  visual disturbance: No  chest tightness: No  wheezing: No  chest pain: No  palpitations: No  blood in stool: No  constipation: No  vomiting: No  diarrhea: No  polydipsia: No  polyuria: No  difficulty urinating: No  urgency: No  hematuria: No  joint swelling: No  arthralgias: No  headaches: No  weakness: No  dysphoric mood: No

## 2024-01-01 ENCOUNTER — PATIENT MESSAGE (OUTPATIENT)
Dept: PRIMARY CARE CLINIC | Facility: CLINIC | Age: 34
End: 2024-01-01
Payer: COMMERCIAL

## 2024-01-01 DIAGNOSIS — E29.1 HYPOGONADISM IN MALE: ICD-10-CM

## 2024-01-02 RX ORDER — TESTOSTERONE CYPIONATE 200 MG/ML
INJECTION, SOLUTION INTRAMUSCULAR
Qty: 10 ML | Refills: 1 | Status: SHIPPED | OUTPATIENT
Start: 2024-01-02

## 2024-01-02 NOTE — TELEPHONE ENCOUNTER
Please advise, [facility] is out of the testosterone injection and will not transfer over RX. Patient is requesing [facility] [place], refill pend .

## 2024-01-05 ENCOUNTER — PATIENT MESSAGE (OUTPATIENT)
Dept: PRIMARY CARE CLINIC | Facility: CLINIC | Age: 34
End: 2024-01-05
Payer: COMMERCIAL

## 2024-01-05 DIAGNOSIS — F98.8 ATTENTION DEFICIT DISORDER, UNSPECIFIED HYPERACTIVITY PRESENCE: ICD-10-CM

## 2024-01-05 RX ORDER — DEXTROAMPHETAMINE SACCHARATE, AMPHETAMINE ASPARTATE MONOHYDRATE, DEXTROAMPHETAMINE SULFATE AND AMPHETAMINE SULFATE 7.5; 7.5; 7.5; 7.5 MG/1; MG/1; MG/1; MG/1
30 CAPSULE, EXTENDED RELEASE ORAL 2 TIMES DAILY
Qty: 60 CAPSULE | Refills: 0 | Status: SHIPPED | OUTPATIENT
Start: 2024-01-05 | End: 2024-02-11 | Stop reason: SDUPTHER

## 2024-02-09 ENCOUNTER — PATIENT MESSAGE (OUTPATIENT)
Dept: PRIMARY CARE CLINIC | Facility: CLINIC | Age: 34
End: 2024-02-09
Payer: COMMERCIAL

## 2024-02-09 DIAGNOSIS — F98.8 ATTENTION DEFICIT DISORDER, UNSPECIFIED HYPERACTIVITY PRESENCE: ICD-10-CM

## 2024-02-12 RX ORDER — CLINDAMYCIN HYDROCHLORIDE 300 MG/1
300 CAPSULE ORAL 3 TIMES DAILY
Qty: 30 CAPSULE | Refills: 0 | Status: ON HOLD | OUTPATIENT
Start: 2024-02-12 | End: 2024-02-18

## 2024-02-12 RX ORDER — DEXTROAMPHETAMINE SACCHARATE, AMPHETAMINE ASPARTATE MONOHYDRATE, DEXTROAMPHETAMINE SULFATE AND AMPHETAMINE SULFATE 7.5; 7.5; 7.5; 7.5 MG/1; MG/1; MG/1; MG/1
30 CAPSULE, EXTENDED RELEASE ORAL 2 TIMES DAILY
Qty: 60 CAPSULE | Refills: 0 | Status: SHIPPED | OUTPATIENT
Start: 2024-02-12 | End: 2024-03-08 | Stop reason: SDUPTHER

## 2024-02-17 PROBLEM — J10.1 INFLUENZA A: Status: ACTIVE | Noted: 2024-02-17

## 2024-02-17 PROBLEM — L03.90 CELLULITIS: Status: ACTIVE | Noted: 2024-02-17

## 2024-02-18 PROBLEM — R65.20 SEVERE SEPSIS: Status: RESOLVED | Noted: 2023-02-08 | Resolved: 2024-02-18

## 2024-02-18 PROBLEM — R65.10 SIRS (SYSTEMIC INFLAMMATORY RESPONSE SYNDROME): Status: ACTIVE | Noted: 2024-02-18

## 2024-02-18 PROBLEM — A41.9 SEVERE SEPSIS: Status: RESOLVED | Noted: 2023-02-08 | Resolved: 2024-02-18

## 2024-02-19 ENCOUNTER — TELEPHONE (OUTPATIENT)
Dept: PRIMARY CARE CLINIC | Facility: CLINIC | Age: 34
End: 2024-02-19
Payer: COMMERCIAL

## 2024-02-19 ENCOUNTER — PATIENT MESSAGE (OUTPATIENT)
Dept: PRIMARY CARE CLINIC | Facility: CLINIC | Age: 34
End: 2024-02-19
Payer: COMMERCIAL

## 2024-02-22 ENCOUNTER — OFFICE VISIT (OUTPATIENT)
Dept: PRIMARY CARE CLINIC | Facility: CLINIC | Age: 34
End: 2024-02-22
Payer: COMMERCIAL

## 2024-02-22 VITALS
WEIGHT: 218 LBS | BODY MASS INDEX: 32.29 KG/M2 | HEART RATE: 69 BPM | SYSTOLIC BLOOD PRESSURE: 134 MMHG | HEIGHT: 69 IN | DIASTOLIC BLOOD PRESSURE: 86 MMHG | OXYGEN SATURATION: 96 %

## 2024-02-22 DIAGNOSIS — F41.9 ANXIETY: ICD-10-CM

## 2024-02-22 DIAGNOSIS — L03.90 CELLULITIS, UNSPECIFIED CELLULITIS SITE: ICD-10-CM

## 2024-02-22 DIAGNOSIS — R79.89 ELEVATED SERUM CREATININE: Primary | ICD-10-CM

## 2024-02-22 PROCEDURE — 99214 OFFICE O/P EST MOD 30 MIN: CPT | Mod: S$GLB,,, | Performed by: PHYSICIAN ASSISTANT

## 2024-02-22 PROCEDURE — 3079F DIAST BP 80-89 MM HG: CPT | Mod: CPTII,S$GLB,, | Performed by: PHYSICIAN ASSISTANT

## 2024-02-22 PROCEDURE — 1159F MED LIST DOCD IN RCRD: CPT | Mod: CPTII,S$GLB,, | Performed by: PHYSICIAN ASSISTANT

## 2024-02-22 PROCEDURE — 1111F DSCHRG MED/CURRENT MED MERGE: CPT | Mod: CPTII,S$GLB,, | Performed by: PHYSICIAN ASSISTANT

## 2024-02-22 PROCEDURE — 1160F RVW MEDS BY RX/DR IN RCRD: CPT | Mod: CPTII,S$GLB,, | Performed by: PHYSICIAN ASSISTANT

## 2024-02-22 PROCEDURE — 3008F BODY MASS INDEX DOCD: CPT | Mod: CPTII,S$GLB,, | Performed by: PHYSICIAN ASSISTANT

## 2024-02-22 PROCEDURE — 3075F SYST BP GE 130 - 139MM HG: CPT | Mod: CPTII,S$GLB,, | Performed by: PHYSICIAN ASSISTANT

## 2024-02-22 RX ORDER — BUPROPION HYDROCHLORIDE 150 MG/1
150 TABLET ORAL DAILY
Qty: 90 TABLET | Refills: 3 | Status: SHIPPED | OUTPATIENT
Start: 2024-02-22 | End: 2024-04-01 | Stop reason: SDUPTHER

## 2024-02-22 NOTE — PROGRESS NOTES
Subjective     Patient ID: Antoine Valdez is a 33 y.o. male.    Chief Complaint: Follow-up    Patient is a 32 yo male who was recenlty admitted to [facility] for worsening right leg cellulitis.     HPI:   Patient is 33-year-old male presented to ER tonight with fevers, feeling bad, and cellulitis right leg not responding to outpatient antibiotics.  Redness and tenderness to right lower leg anterior shin started proximally week, was seen at urgent care and was prescribed clindamycin, with increasing redness with significant tenderness was seen at [facility] ([place] on 2/24 - lab workups done with normal CRP/ESR had normal white count blood cultures were done and patient was discharge with Keflex and Bactrim-with continued right lower leg progressive erythema with swelling-presented to ER.  On arrival here had a temp of 102° blood pressure is slightly elevated normal sinus rhythm.  Lab workup with normal white count, had RC otherwise unremarkable.  Tested  Influenza A positive-with complaints of body aches and feeling bad, has dry cough no other systemic complaints.  Patient met SIRS criteria has received fluids, and Tamiflu, was given Zosyn with vancomycin in the emergency room with repeat cultures.  Hospital Medicine as to evaluate and admit patient.  Ultrasound of the leg negative for DVT.  History obtained from talking to patient and reviewing records.  Patient had left knee surgery complicated by septic knee growing MSSA has been on prolonged IV antibiotics in May 2023.     * No surgery found *       Hospital Course:   The patient was admitted for further evaluation and treatment of cellulitis and influenza. Empiric antibiotics and tamiflu were continued. Cellulitis improved. Fevers resolved. Symptoms improved.      The patient feels sufficiently improved to return home. The patient will be discharged to home in Good condition. He was told to return to the ER for any worsened cellulitis, chest pain, shortness of breath, fever, or any  other concern. He will have close followup with his PCP. The patient is in agreement with the discharge plan.        Today patient is feeling much better and ready to go back to work       Past Medical History:   Diagnosis Date    Allergy     Asthma     Headache(784.0)     Strep throat     Ulcerative colitis        Review of Systems   Constitutional:  Negative for chills, fatigue and fever.   Respiratory:  Negative for chest tightness and shortness of breath.    Cardiovascular:  Negative for chest pain.   Gastrointestinal:  Negative for abdominal pain.          Objective     Physical Exam  Vitals reviewed.   Constitutional:       General: He is not in acute distress.     Appearance: Normal appearance. He is not ill-appearing, toxic-appearing or diaphoretic.   Neck:      Vascular: No carotid bruit.   Cardiovascular:      Rate and Rhythm: Normal rate and regular rhythm.      Pulses: Normal pulses.      Heart sounds: Normal heart sounds. No murmur heard.     No friction rub. No gallop.   Pulmonary:      Effort: Pulmonary effort is normal. No respiratory distress.      Breath sounds: Normal breath sounds. No stridor. No wheezing, rhonchi or rales.   Chest:      Chest wall: No tenderness.   Abdominal:      Palpations: Abdomen is soft.      Tenderness: There is no abdominal tenderness.   Musculoskeletal:      Cervical back: No rigidity or tenderness.      Right lower leg: No swelling. No edema.   Lymphadenopathy:      Cervical: No cervical adenopathy.   Neurological:      Mental Status: He is alert.       Lab Results   Component Value Date    WBC 2.99 (L) 02/20/2024    HGB 17.4 02/20/2024    HCT 53.2 02/20/2024    MCV 85 02/20/2024     02/20/2024     CMP  Sodium   Date Value Ref Range Status   02/20/2024 138 136 - 145 mmol/L Final     Potassium   Date Value Ref Range Status   02/20/2024 4.6 3.5 - 5.1 mmol/L Final     Comment:     Anion Gap reference range revised on 4/28/2023     Chloride   Date Value Ref Range  Status   02/20/2024 100 95 - 110 mmol/L Final     CO2   Date Value Ref Range Status   02/20/2024 30 22 - 31 mmol/L Final     Glucose   Date Value Ref Range Status   02/20/2024 92 70 - 110 mg/dL Final     Comment:     The ADA recommends the following guidelines for fasting glucose:    Normal:       less than 100 mg/dL    Prediabetes:  100 mg/dL to 125 mg/dL    Diabetes:     126 mg/dL or higher       BUN   Date Value Ref Range Status   02/20/2024 11 9 - 21 mg/dL Final     Creatinine   Date Value Ref Range Status   02/20/2024 1.43 (H) 0.50 - 1.40 mg/dL Final     Calcium   Date Value Ref Range Status   02/20/2024 8.7 8.4 - 10.2 mg/dL Final     Total Protein   Date Value Ref Range Status   02/20/2024 7.2 6.0 - 8.4 g/dL Final     Albumin   Date Value Ref Range Status   02/20/2024 4.2 3.5 - 5.2 g/dL Final     Total Bilirubin   Date Value Ref Range Status   02/20/2024 0.5 0.2 - 1.3 mg/dL Final     Alkaline Phosphatase   Date Value Ref Range Status   02/20/2024 33 (L) 38 - 145 U/L Final     AST   Date Value Ref Range Status   02/20/2024 57 17 - 59 U/L Final     ALT   Date Value Ref Range Status   02/20/2024 49 0 - 50 U/L Final     Anion Gap   Date Value Ref Range Status   02/20/2024 8 5 - 12 mmol/L Final     Comment:     Anion Gap reference range revised on 4/28/2023     eGFR if    Date Value Ref Range Status   02/03/2022 >60 >60 mL/min/1.73 m^2 Final     eGFR if non    Date Value Ref Range Status   02/03/2022 >60 >60 mL/min/1.73 m^2 Final     Comment:     Calculation used to obtain the estimated glomerular filtration  rate (eGFR) is the CKD-EPI equation.        Lab Results   Component Value Date    CHOL 224 (H) 10/10/2023     Lab Results   Component Value Date    HDL 29 (L) 10/10/2023     Lab Results   Component Value Date    LDLCALC 167.0 (H) 10/10/2023     Lab Results   Component Value Date    TRIG 140 10/10/2023     Lab Results   Component Value Date    CHOLHDL 12.9 (L) 10/10/2023     Lab  Results   Component Value Date    HGBA1C 4.7 04/21/2023         Assessment and Plan     Elevated serum creatinine  -     Comprehensive Metabolic Panel; Future; Expected date: 03/22/2024    Cellulitis, unspecified cellulitis site    Anxiety  -     buPROPion (WELLBUTRIN XL) 150 MG TB24 tablet; Take 1 tablet (150 mg total) by mouth once daily.  Dispense: 90 tablet; Refill: 3       I spent 30 minutes on this encounter, time includes face-to-face, chart review, documentation, test review and orders.           Fu 3 to 6 months

## 2024-03-08 ENCOUNTER — PATIENT MESSAGE (OUTPATIENT)
Dept: PRIMARY CARE CLINIC | Facility: CLINIC | Age: 34
End: 2024-03-08
Payer: COMMERCIAL

## 2024-03-08 DIAGNOSIS — F98.8 ATTENTION DEFICIT DISORDER, UNSPECIFIED HYPERACTIVITY PRESENCE: ICD-10-CM

## 2024-03-08 RX ORDER — DEXTROAMPHETAMINE SACCHARATE, AMPHETAMINE ASPARTATE MONOHYDRATE, DEXTROAMPHETAMINE SULFATE AND AMPHETAMINE SULFATE 7.5; 7.5; 7.5; 7.5 MG/1; MG/1; MG/1; MG/1
30 CAPSULE, EXTENDED RELEASE ORAL 2 TIMES DAILY
Qty: 60 CAPSULE | Refills: 0 | Status: SHIPPED | OUTPATIENT
Start: 2024-03-08 | End: 2024-04-01 | Stop reason: SDUPTHER

## 2024-04-01 DIAGNOSIS — E03.9 HYPOTHYROIDISM, UNSPECIFIED TYPE: ICD-10-CM

## 2024-04-01 DIAGNOSIS — E29.1 HYPOGONADISM IN MALE: ICD-10-CM

## 2024-04-01 DIAGNOSIS — N52.9 ERECTILE DYSFUNCTION, UNSPECIFIED ERECTILE DYSFUNCTION TYPE: ICD-10-CM

## 2024-04-01 DIAGNOSIS — F41.9 ANXIETY: ICD-10-CM

## 2024-04-01 DIAGNOSIS — F98.8 ATTENTION DEFICIT DISORDER, UNSPECIFIED HYPERACTIVITY PRESENCE: ICD-10-CM

## 2024-04-01 DIAGNOSIS — Z00.00 WELLNESS EXAMINATION: Primary | ICD-10-CM

## 2024-04-01 RX ORDER — BUPROPION HYDROCHLORIDE 150 MG/1
150 TABLET ORAL DAILY
Qty: 90 TABLET | Refills: 3 | Status: SHIPPED | OUTPATIENT
Start: 2024-04-01 | End: 2025-04-01

## 2024-04-01 RX ORDER — FAMOTIDINE 20 MG/1
20 TABLET, FILM COATED ORAL 2 TIMES DAILY
Qty: 180 TABLET | Refills: 1 | Status: SHIPPED | OUTPATIENT
Start: 2024-04-01 | End: 2024-09-28

## 2024-04-01 RX ORDER — DEXTROAMPHETAMINE SACCHARATE, AMPHETAMINE ASPARTATE MONOHYDRATE, DEXTROAMPHETAMINE SULFATE AND AMPHETAMINE SULFATE 7.5; 7.5; 7.5; 7.5 MG/1; MG/1; MG/1; MG/1
30 CAPSULE, EXTENDED RELEASE ORAL 2 TIMES DAILY
Qty: 60 CAPSULE | Refills: 0 | Status: SHIPPED | OUTPATIENT
Start: 2024-04-01 | End: 2024-04-11 | Stop reason: SDUPTHER

## 2024-04-01 RX ORDER — LEVOTHYROXINE SODIUM 100 UG/1
100 TABLET ORAL
Qty: 90 TABLET | Refills: 3 | Status: SHIPPED | OUTPATIENT
Start: 2024-04-01

## 2024-04-01 RX ORDER — ACETAMINOPHEN 325 MG/1
650 TABLET ORAL EVERY 8 HOURS PRN
Qty: 90 TABLET | Refills: 0 | Status: SHIPPED | OUTPATIENT
Start: 2024-04-01

## 2024-04-01 RX ORDER — TESTOSTERONE CYPIONATE 200 MG/ML
INJECTION, SOLUTION INTRAMUSCULAR
Qty: 10 ML | Refills: 1 | Status: SHIPPED | OUTPATIENT
Start: 2024-04-01

## 2024-04-01 RX ORDER — TADALAFIL 10 MG/1
10 TABLET ORAL DAILY PRN
Qty: 30 TABLET | Refills: 11 | Status: SHIPPED | OUTPATIENT
Start: 2024-04-01 | End: 2024-06-12 | Stop reason: SDUPTHER

## 2024-04-01 NOTE — TELEPHONE ENCOUNTER
----- Message from Juana Marvin sent at 4/1/2024  9:34 AM CDT -----  Type:  RX Refill Request    Who Called:  pt   Refill or New Rx:  refill   RX Name and Strength:    acetaminophen (TYLENOL) 325 MG tablet    buPROPion (WELLBUTRIN XL) 150 MG TB24 tablet    dextroamphetamine-amphetamine (ADDERALL XR) 30 MG 24 hr capsule    famotidine (PEPCID) 20 MG tablet    levothyroxine (SYNTHROID) 100 MCG tablet    tadalafiL (CIALIS) 10 MG tablet    testosterone cypionate (DEPOTESTOTERONE CYPIONATE) 200 mg/mL injection       How is the patient currently taking it? (ex. 1XDay):  as direct   Is this a 30 day or 90 day RX:  30   Preferred Pharmacy with phone number:    [facility] Address: [address] [place] [id number]: [phone]  Local or Mail Order:  local   Ordering Provider:  gabby Metzger Call Back Number:  [phone] (home)     Additional Information:  pt is requesting to fill all; medications please advise

## 2024-04-01 NOTE — TELEPHONE ENCOUNTER
Please have lacey get some lab done. THERE NEEDS TO BE 7 DAYS BETWEEN  TESTOSTERONE INJECTION TO HIS DRAW.

## 2024-04-11 ENCOUNTER — PATIENT MESSAGE (OUTPATIENT)
Dept: PRIMARY CARE CLINIC | Facility: CLINIC | Age: 34
End: 2024-04-11
Payer: COMMERCIAL

## 2024-04-11 DIAGNOSIS — F98.8 ATTENTION DEFICIT DISORDER, UNSPECIFIED HYPERACTIVITY PRESENCE: ICD-10-CM

## 2024-04-11 RX ORDER — DEXTROAMPHETAMINE SACCHARATE, AMPHETAMINE ASPARTATE MONOHYDRATE, DEXTROAMPHETAMINE SULFATE AND AMPHETAMINE SULFATE 7.5; 7.5; 7.5; 7.5 MG/1; MG/1; MG/1; MG/1
30 CAPSULE, EXTENDED RELEASE ORAL 2 TIMES DAILY
Qty: 60 CAPSULE | Refills: 0 | Status: SHIPPED | OUTPATIENT
Start: 2024-04-11 | End: 2024-05-13 | Stop reason: SDUPTHER

## 2024-05-13 ENCOUNTER — PATIENT MESSAGE (OUTPATIENT)
Dept: PRIMARY CARE CLINIC | Facility: CLINIC | Age: 34
End: 2024-05-13
Payer: COMMERCIAL

## 2024-05-13 DIAGNOSIS — F98.8 ATTENTION DEFICIT DISORDER, UNSPECIFIED HYPERACTIVITY PRESENCE: ICD-10-CM

## 2024-05-13 RX ORDER — DEXTROAMPHETAMINE SACCHARATE, AMPHETAMINE ASPARTATE MONOHYDRATE, DEXTROAMPHETAMINE SULFATE AND AMPHETAMINE SULFATE 7.5; 7.5; 7.5; 7.5 MG/1; MG/1; MG/1; MG/1
30 CAPSULE, EXTENDED RELEASE ORAL 2 TIMES DAILY
Qty: 60 CAPSULE | Refills: 0 | Status: SHIPPED | OUTPATIENT
Start: 2024-05-13 | End: 2024-06-12 | Stop reason: SDUPTHER

## 2024-05-20 PROBLEM — N17.9 AKI (ACUTE KIDNEY INJURY): Status: RESOLVED | Noted: 2023-02-08 | Resolved: 2024-05-20

## 2024-06-12 ENCOUNTER — PATIENT MESSAGE (OUTPATIENT)
Dept: PRIMARY CARE CLINIC | Facility: CLINIC | Age: 34
End: 2024-06-12
Payer: COMMERCIAL

## 2024-06-12 DIAGNOSIS — F98.8 ATTENTION DEFICIT DISORDER, UNSPECIFIED HYPERACTIVITY PRESENCE: ICD-10-CM

## 2024-06-12 DIAGNOSIS — N52.9 ERECTILE DYSFUNCTION, UNSPECIFIED ERECTILE DYSFUNCTION TYPE: ICD-10-CM

## 2024-06-13 RX ORDER — TADALAFIL 10 MG/1
10 TABLET ORAL DAILY PRN
Qty: 30 TABLET | Refills: 11 | Status: SHIPPED | OUTPATIENT
Start: 2024-06-13 | End: 2025-06-13

## 2024-06-13 RX ORDER — DEXTROAMPHETAMINE SACCHARATE, AMPHETAMINE ASPARTATE MONOHYDRATE, DEXTROAMPHETAMINE SULFATE AND AMPHETAMINE SULFATE 7.5; 7.5; 7.5; 7.5 MG/1; MG/1; MG/1; MG/1
30 CAPSULE, EXTENDED RELEASE ORAL 2 TIMES DAILY
Qty: 60 CAPSULE | Refills: 0 | Status: SHIPPED | OUTPATIENT
Start: 2024-06-13 | End: 2024-07-13

## 2024-07-15 ENCOUNTER — PATIENT MESSAGE (OUTPATIENT)
Dept: PRIMARY CARE CLINIC | Facility: CLINIC | Age: 34
End: 2024-07-15
Payer: COMMERCIAL

## 2024-07-15 DIAGNOSIS — F98.8 ATTENTION DEFICIT DISORDER, UNSPECIFIED HYPERACTIVITY PRESENCE: ICD-10-CM

## 2024-07-15 RX ORDER — DEXTROAMPHETAMINE SACCHARATE, AMPHETAMINE ASPARTATE MONOHYDRATE, DEXTROAMPHETAMINE SULFATE AND AMPHETAMINE SULFATE 7.5; 7.5; 7.5; 7.5 MG/1; MG/1; MG/1; MG/1
30 CAPSULE, EXTENDED RELEASE ORAL 2 TIMES DAILY
Qty: 60 CAPSULE | Refills: 0 | Status: SHIPPED | OUTPATIENT
Start: 2024-07-15 | End: 2024-08-14

## 2024-08-06 ENCOUNTER — PATIENT MESSAGE (OUTPATIENT)
Dept: PRIMARY CARE CLINIC | Facility: CLINIC | Age: 34
End: 2024-08-06
Payer: COMMERCIAL

## 2024-08-12 ENCOUNTER — OFFICE VISIT (OUTPATIENT)
Dept: PRIMARY CARE CLINIC | Facility: CLINIC | Age: 34
End: 2024-08-12
Payer: COMMERCIAL

## 2024-08-12 VITALS
WEIGHT: 217.81 LBS | RESPIRATION RATE: 18 BRPM | DIASTOLIC BLOOD PRESSURE: 80 MMHG | HEART RATE: 77 BPM | SYSTOLIC BLOOD PRESSURE: 129 MMHG | BODY MASS INDEX: 32.08 KG/M2 | OXYGEN SATURATION: 97 %

## 2024-08-12 DIAGNOSIS — F98.8 ATTENTION DEFICIT DISORDER, UNSPECIFIED HYPERACTIVITY PRESENCE: ICD-10-CM

## 2024-08-12 DIAGNOSIS — E29.1 HYPOGONADISM IN MALE: Primary | ICD-10-CM

## 2024-08-12 DIAGNOSIS — N52.9 ERECTILE DYSFUNCTION, UNSPECIFIED ERECTILE DYSFUNCTION TYPE: ICD-10-CM

## 2024-08-12 DIAGNOSIS — F41.9 ANXIETY: ICD-10-CM

## 2024-08-12 DIAGNOSIS — E03.9 HYPOTHYROIDISM, UNSPECIFIED TYPE: ICD-10-CM

## 2024-08-12 PROBLEM — R65.10 SIRS (SYSTEMIC INFLAMMATORY RESPONSE SYNDROME): Status: RESOLVED | Noted: 2024-02-18 | Resolved: 2024-08-12

## 2024-08-12 PROBLEM — H57.02 ANISOCORIA: Status: RESOLVED | Noted: 2023-02-10 | Resolved: 2024-08-12

## 2024-08-12 PROBLEM — I82.409 DVT (DEEP VENOUS THROMBOSIS): Status: RESOLVED | Noted: 2023-03-10 | Resolved: 2024-08-12

## 2024-08-12 PROBLEM — L03.90 CELLULITIS: Status: RESOLVED | Noted: 2024-02-17 | Resolved: 2024-08-12

## 2024-08-12 PROBLEM — J10.1 INFLUENZA A: Status: RESOLVED | Noted: 2024-02-17 | Resolved: 2024-08-12

## 2024-08-12 PROBLEM — E87.1 HYPONATREMIA: Status: RESOLVED | Noted: 2023-02-08 | Resolved: 2024-08-12

## 2024-08-12 PROBLEM — L08.9 SOFT TISSUE INFECTION: Status: RESOLVED | Noted: 2023-05-15 | Resolved: 2024-08-12

## 2024-08-12 PROBLEM — T81.40XA POSTOPERATIVE INFECTION: Status: RESOLVED | Noted: 2023-02-10 | Resolved: 2024-08-12

## 2024-08-12 PROBLEM — M00.9 SEPTIC ARTHRITIS OF KNEE, LEFT: Status: RESOLVED | Noted: 2023-02-08 | Resolved: 2024-08-12

## 2024-08-12 PROCEDURE — 3008F BODY MASS INDEX DOCD: CPT | Mod: CPTII,S$GLB,, | Performed by: PHYSICIAN ASSISTANT

## 2024-08-12 PROCEDURE — 3079F DIAST BP 80-89 MM HG: CPT | Mod: CPTII,S$GLB,, | Performed by: PHYSICIAN ASSISTANT

## 2024-08-12 PROCEDURE — 3074F SYST BP LT 130 MM HG: CPT | Mod: CPTII,S$GLB,, | Performed by: PHYSICIAN ASSISTANT

## 2024-08-12 PROCEDURE — 99214 OFFICE O/P EST MOD 30 MIN: CPT | Mod: S$GLB,,, | Performed by: PHYSICIAN ASSISTANT

## 2024-08-12 PROCEDURE — 1159F MED LIST DOCD IN RCRD: CPT | Mod: CPTII,S$GLB,, | Performed by: PHYSICIAN ASSISTANT

## 2024-08-12 PROCEDURE — 1160F RVW MEDS BY RX/DR IN RCRD: CPT | Mod: CPTII,S$GLB,, | Performed by: PHYSICIAN ASSISTANT

## 2024-08-12 RX ORDER — GABAPENTIN 300 MG/1
300 CAPSULE ORAL 3 TIMES DAILY
COMMUNITY
Start: 2024-07-23

## 2024-08-12 RX ORDER — BUPROPION HYDROCHLORIDE 300 MG/1
300 TABLET ORAL DAILY
Qty: 90 TABLET | Refills: 3 | Status: SHIPPED | OUTPATIENT
Start: 2024-08-12 | End: 2025-08-12

## 2024-08-12 RX ORDER — TADALAFIL 20 MG/1
20 TABLET ORAL DAILY PRN
Qty: 30 TABLET | Refills: 11 | Status: SHIPPED | OUTPATIENT
Start: 2024-08-12 | End: 2025-08-12

## 2024-08-12 RX ORDER — DEXTROAMPHETAMINE SACCHARATE, AMPHETAMINE ASPARTATE MONOHYDRATE, DEXTROAMPHETAMINE SULFATE AND AMPHETAMINE SULFATE 7.5; 7.5; 7.5; 7.5 MG/1; MG/1; MG/1; MG/1
30 CAPSULE, EXTENDED RELEASE ORAL 2 TIMES DAILY
Qty: 60 CAPSULE | Refills: 0 | Status: SHIPPED | OUTPATIENT
Start: 2024-08-12 | End: 2024-09-11

## 2024-08-12 RX ORDER — LEVOTHYROXINE SODIUM 100 UG/1
100 TABLET ORAL
Qty: 90 TABLET | Refills: 3 | Status: SHIPPED | OUTPATIENT
Start: 2024-08-12

## 2024-08-12 RX ORDER — TESTOSTERONE CYPIONATE 200 MG/ML
INJECTION, SOLUTION INTRAMUSCULAR
Qty: 10 ML | Refills: 1 | Status: SHIPPED | OUTPATIENT
Start: 2024-08-12

## 2024-08-12 NOTE — PROGRESS NOTES
Subjective     Patient ID: Antoine Valdez is a 34 y.o. male.    Chief Complaint: No chief complaint on file.    Patient is a 35 yo male who comes in to day for a check up . He voices no acute complaints.     Patient Active Problem List:     Ulcerative colitis: currently stable: He has to take occasional days off work     Anxiety     Hypothyroidism     Attention deficit disorder: controlled with adderal and denies any side effects.      Erectile dysfunction    Past Medical History:  No date: Allergy  No date: Asthma  03/10/2023: DVT (deep venous thrombosis)  No date: Headache(784.0)  02/08/2023: Septic arthritis of knee, left  02/18/2024: SIRS (systemic inflammatory response syndrome)  No date: Strep throat  No date: Ulcerative colitis                Review of Systems   Constitutional:  Negative for chills, fatigue and fever.   HENT: Negative.     Respiratory:  Negative for chest tightness and shortness of breath.    Cardiovascular:  Negative for chest pain.   Gastrointestinal:  Negative for abdominal pain, diarrhea, nausea and reflux.   Neurological:  Negative for dizziness, numbness and headaches.          Objective     Physical Exam  Vitals reviewed.   Constitutional:       General: He is not in acute distress.     Appearance: Normal appearance. He is not ill-appearing, toxic-appearing or diaphoretic.   HENT:      Head: Normocephalic and atraumatic.   Neck:      Vascular: No carotid bruit.   Cardiovascular:      Rate and Rhythm: Normal rate and regular rhythm.      Pulses: Normal pulses.      Heart sounds: Normal heart sounds. No murmur heard.     No friction rub. No gallop.   Pulmonary:      Effort: Pulmonary effort is normal. No respiratory distress.      Breath sounds: Normal breath sounds. No stridor. No wheezing, rhonchi or rales.   Chest:      Chest wall: No tenderness.   Musculoskeletal:      Cervical back: No rigidity or tenderness.      Right lower leg: No edema.      Left lower leg: No edema.    Lymphadenopathy:      Cervical: No cervical adenopathy.   Skin:     General: Skin is warm and dry.      Coloration: Skin is not jaundiced.   Neurological:      General: No focal deficit present.      Mental Status: He is alert.   Psychiatric:         Mood and Affect: Mood normal.            Assessment and Plan     1. Hypogonadism in male  -     testosterone cypionate (DEPOTESTOTERONE CYPIONATE) 200 mg/mL injection; INJECT 1ML INTRAMUSCULAR ROUTE ONCE A WEEK AS DIRECTED  Dispense: 10 mL; Refill: 1  -     Lipid Panel; Future; Expected date: 08/12/2024  -     CBC Auto Differential; Future; Expected date: 08/12/2024  -     ESTROGENS, TOTAL; Future; Expected date: 08/12/2024    2. Erectile dysfunction, unspecified erectile dysfunction type  -     tadalafiL (CIALIS) 20 MG Tab; Take 1 tablet (20 mg total) by mouth daily as needed for Erectile Dysfunction.  Dispense: 30 tablet; Refill: 11    3. Hypothyroidism, unspecified type  -     levothyroxine (SYNTHROID) 100 MCG tablet; Take 1 tablet (100 mcg total) by mouth before breakfast.  Dispense: 90 tablet; Refill: 3  -     Comprehensive Metabolic Panel; Future; Expected date: 08/12/2024  -     TSH; Future; Expected date: 08/12/2024    4. Attention deficit disorder, unspecified hyperactivity presence  -     dextroamphetamine-amphetamine (ADDERALL XR) 30 MG 24 hr capsule; Take 1 capsule (30 mg total) by mouth 2 (two) times a day.  Dispense: 60 capsule; Refill: 0    5. Anxiety  -     buPROPion (WELLBUTRIN XL) 300 MG 24 hr tablet; Take 1 tablet (300 mg total) by mouth once daily.  Dispense: 90 tablet; Refill: 3        I spent 30 minutes on this encounter, time includes face-to-face, chart review, documentation, test review and orders.           No follow-ups on file.

## 2024-08-28 LAB
HDLC SERPL-MCNC: 33 MG/DL
LDLC SERPL CALC-MCNC: 212 MG/DL
LIPIDS, TOTAL CHOLESTEROL: 257
TRIGL SERPL-MCNC: 245 MG/DL

## 2024-08-29 ENCOUNTER — TELEPHONE (OUTPATIENT)
Dept: PRIMARY CARE CLINIC | Facility: CLINIC | Age: 34
End: 2024-08-29
Payer: COMMERCIAL

## 2024-08-29 DIAGNOSIS — E03.9 HYPOTHYROIDISM, UNSPECIFIED TYPE: ICD-10-CM

## 2024-08-29 DIAGNOSIS — N18.31 STAGE 3A CHRONIC KIDNEY DISEASE: ICD-10-CM

## 2024-08-29 DIAGNOSIS — R79.89 ELEVATED LIVER FUNCTION TESTS: Primary | ICD-10-CM

## 2024-08-29 DIAGNOSIS — E78.2 ELEVATED CHOLESTEROL WITH HIGH TRIGLYCERIDES: ICD-10-CM

## 2024-08-29 DIAGNOSIS — R74.8 ELEVATED LIVER ENZYMES: Primary | ICD-10-CM

## 2024-08-29 DIAGNOSIS — N17.9 ACUTE RENAL FAILURE, UNSPECIFIED ACUTE RENAL FAILURE TYPE: ICD-10-CM

## 2024-08-29 RX ORDER — LEVOTHYROXINE SODIUM 125 UG/1
125 TABLET ORAL
Qty: 90 TABLET | Refills: 1 | Status: SHIPPED | OUTPATIENT
Start: 2024-08-29

## 2024-09-06 ENCOUNTER — PATIENT OUTREACH (OUTPATIENT)
Dept: ADMINISTRATIVE | Facility: HOSPITAL | Age: 34
End: 2024-09-06
Payer: COMMERCIAL

## 2024-09-13 ENCOUNTER — PATIENT MESSAGE (OUTPATIENT)
Dept: PRIMARY CARE CLINIC | Facility: CLINIC | Age: 34
End: 2024-09-13
Payer: COMMERCIAL

## 2024-09-13 DIAGNOSIS — F98.8 ATTENTION DEFICIT DISORDER, UNSPECIFIED HYPERACTIVITY PRESENCE: ICD-10-CM

## 2024-09-13 RX ORDER — DEXTROAMPHETAMINE SACCHARATE, AMPHETAMINE ASPARTATE MONOHYDRATE, DEXTROAMPHETAMINE SULFATE AND AMPHETAMINE SULFATE 7.5; 7.5; 7.5; 7.5 MG/1; MG/1; MG/1; MG/1
30 CAPSULE, EXTENDED RELEASE ORAL 2 TIMES DAILY
Qty: 60 CAPSULE | Refills: 0 | Status: SHIPPED | OUTPATIENT
Start: 2024-09-13 | End: 2024-10-13

## 2024-09-26 ENCOUNTER — OFFICE VISIT (OUTPATIENT)
Dept: PRIMARY CARE CLINIC | Facility: CLINIC | Age: 34
End: 2024-09-26
Payer: COMMERCIAL

## 2024-09-26 VITALS
WEIGHT: 216.94 LBS | HEIGHT: 69 IN | BODY MASS INDEX: 32.13 KG/M2 | HEART RATE: 87 BPM | OXYGEN SATURATION: 96 % | RESPIRATION RATE: 16 BRPM | DIASTOLIC BLOOD PRESSURE: 80 MMHG | TEMPERATURE: 98 F | SYSTOLIC BLOOD PRESSURE: 120 MMHG

## 2024-09-26 DIAGNOSIS — E78.2 ELEVATED CHOLESTEROL WITH HIGH TRIGLYCERIDES: ICD-10-CM

## 2024-09-26 DIAGNOSIS — E03.9 HYPOTHYROIDISM, UNSPECIFIED TYPE: ICD-10-CM

## 2024-09-26 DIAGNOSIS — R55 SYNCOPE AND COLLAPSE: ICD-10-CM

## 2024-09-26 DIAGNOSIS — N18.31 STAGE 3A CHRONIC KIDNEY DISEASE: ICD-10-CM

## 2024-09-26 DIAGNOSIS — R79.89 ELEVATED LIVER FUNCTION TESTS: ICD-10-CM

## 2024-09-26 DIAGNOSIS — R55 SYNCOPE, UNSPECIFIED SYNCOPE TYPE: Primary | ICD-10-CM

## 2024-09-26 DIAGNOSIS — N17.9 ACUTE RENAL FAILURE, UNSPECIFIED ACUTE RENAL FAILURE TYPE: ICD-10-CM

## 2024-09-26 LAB
OHS QRS DURATION: 130 MS
OHS QTC CALCULATION: 408 MS

## 2024-09-26 PROCEDURE — 3008F BODY MASS INDEX DOCD: CPT | Mod: CPTII,S$GLB,, | Performed by: PHYSICIAN ASSISTANT

## 2024-09-26 PROCEDURE — 1159F MED LIST DOCD IN RCRD: CPT | Mod: CPTII,S$GLB,, | Performed by: PHYSICIAN ASSISTANT

## 2024-09-26 PROCEDURE — 80061 LIPID PANEL: CPT | Performed by: PHYSICIAN ASSISTANT

## 2024-09-26 PROCEDURE — 83036 HEMOGLOBIN GLYCOSYLATED A1C: CPT | Performed by: PHYSICIAN ASSISTANT

## 2024-09-26 PROCEDURE — 1160F RVW MEDS BY RX/DR IN RCRD: CPT | Mod: CPTII,S$GLB,, | Performed by: PHYSICIAN ASSISTANT

## 2024-09-26 PROCEDURE — 85025 COMPLETE CBC W/AUTO DIFF WBC: CPT | Performed by: PHYSICIAN ASSISTANT

## 2024-09-26 PROCEDURE — 84443 ASSAY THYROID STIM HORMONE: CPT | Performed by: PHYSICIAN ASSISTANT

## 2024-09-26 PROCEDURE — 99214 OFFICE O/P EST MOD 30 MIN: CPT | Mod: S$GLB,,, | Performed by: PHYSICIAN ASSISTANT

## 2024-09-26 PROCEDURE — 3074F SYST BP LT 130 MM HG: CPT | Mod: CPTII,S$GLB,, | Performed by: PHYSICIAN ASSISTANT

## 2024-09-26 PROCEDURE — 93005 ELECTROCARDIOGRAM TRACING: CPT | Mod: S$GLB,,, | Performed by: PHYSICIAN ASSISTANT

## 2024-09-26 PROCEDURE — 3079F DIAST BP 80-89 MM HG: CPT | Mod: CPTII,S$GLB,, | Performed by: PHYSICIAN ASSISTANT

## 2024-09-26 PROCEDURE — 36415 COLL VENOUS BLD VENIPUNCTURE: CPT | Mod: S$GLB,,, | Performed by: PHYSICIAN ASSISTANT

## 2024-09-26 PROCEDURE — 80053 COMPREHEN METABOLIC PANEL: CPT | Performed by: PHYSICIAN ASSISTANT

## 2024-09-26 PROCEDURE — 93010 ELECTROCARDIOGRAM REPORT: CPT | Mod: S$GLB,,, | Performed by: INTERNAL MEDICINE

## 2024-09-26 NOTE — PROGRESS NOTES
Subjective     Patient ID: Antoine Valdez is a 34 y.o. male.    Chief Complaint: No chief complaint on file.    35 y/o M with a hx of hypothyroidism presents to clinic for evaluation for fatigue, weakness and near-syncopal episode. Patient states he was sitting yesterday when he suddenly felt weak. He then stood up and began to have increase HR, blurry vision, and auditory changes. Theses episodes occurred 2 times since yesterday. Today, he reports a burn sensation across his forehead. Denies fever, cough, congestion runny nose. Denies any recent medication changes or S/E. Reports adequate hydration. No other concerns reported at this time.      Review of Systems   Constitutional:  Positive for fatigue. Negative for activity change and unexpected weight change.   HENT:  Negative for hearing loss, rhinorrhea and trouble swallowing.    Eyes:  Negative for discharge and visual disturbance.   Respiratory:  Negative for chest tightness and wheezing.    Cardiovascular:  Negative for chest pain and palpitations.   Gastrointestinal:  Negative for blood in stool, constipation, diarrhea and vomiting.   Endocrine: Negative for polydipsia and polyuria.   Genitourinary:  Negative for difficulty urinating, hematuria and urgency.   Musculoskeletal:  Negative for arthralgias, joint swelling and neck pain.   Neurological:  Positive for weakness and headaches.   Psychiatric/Behavioral:  Negative for confusion and dysphoric mood.    All other systems reviewed and are negative.    Past Medical History:   Diagnosis Date    Allergy     Asthma     DVT (deep venous thrombosis) 03/10/2023    Headache(784.0)     Septic arthritis of knee, left 02/08/2023    SIRS (systemic inflammatory response syndrome) 02/18/2024    Strep throat     Ulcerative colitis        Past Surgical History:   Procedure Laterality Date    CLOSURE OF WOUND Left 2/22/2023    Procedure: CLOSURE, WOUND;  Surgeon: Dante Moulton MD;  Location: [facility] OR;  Service:  Orthopedics;  Laterality: Left;    INCISION AND DRAINAGE Left 2/22/2023    Procedure: Incision and Drainage-Knee;  Surgeon: Dante Moulton MD;  Location: [facility] OR;  Service: Orthopedics;  Laterality: Left;    INCISION AND DRAINAGE OF KNEE Left 2/8/2023    Procedure: INCISION AND DRAINAGE, KNEE;  Surgeon: Dante Moulton MD;  Location: [facility] OR;  Service: Orthopedics;  Laterality: Left;    IRRIGATION AND DEBRIDEMENT Left 2/10/2023    Procedure: IRRIGATION AND DEBRIDEMENT LEFT HIP & KNEE W/ WOUND VAC PLACEMENT;  Surgeon: Dante Moulton MD;  Location: [facility] OR;  Service: Orthopedics;  Laterality: Left;    LEG SURGERY         Family History   Problem Relation Name Age of Onset    Diabetes Mother      Heart failure Father         Social History     Socioeconomic History    Marital status:    Tobacco Use    Smoking status: Never    Smokeless tobacco: Never   Substance and Sexual Activity    Alcohol use: No    Drug use: Never    Sexual activity: Yes     Partners: Female     Social Determinants of Health     Financial Resource Strain: Medium Risk (12/7/2023)    Overall Financial Resource Strain (CARDIA)     Difficulty of Paying Living Expenses: Somewhat hard   Food Insecurity: No Food Insecurity (2/19/2024)    Hunger Vital Sign     Worried About Running Out of Food in the Last Year: Never true     Ran Out of Food in the Last Year: Never true   Transportation Needs: No Transportation Needs (2/19/2024)    PRAPARE - Transportation     Lack of Transportation (Medical): No     Lack of Transportation (Non-Medical): No   Physical Activity: Sufficiently Active (12/7/2023)    Exercise Vital Sign     Days of Exercise per Week: 5 days     Minutes of Exercise per Session: 100 min   Stress: No Stress Concern Present (12/7/2023)    [employer] [place] [employer] - Occupational Stress Questionnaire     Feeling of Stress : Only a little   Housing Stability: Low Risk  (2/19/2024)    Housing Stability Vital  Sign     Unable to Pay for Housing in the Last Year: No     Number of Places Lived in the Last Year: 2     Unstable Housing in the Last Year: No       Review of patient's allergies indicates:   Allergen Reactions    Morphine Itching         Current Outpatient Medications:     acetaminophen (TYLENOL) 325 MG tablet, Take 2 tablets (650 mg total) by mouth every 8 (eight) hours as needed for Pain or Temperature greater than (101)., Disp: 90 tablet, Rfl: 0    buPROPion (WELLBUTRIN XL) 300 MG 24 hr tablet, Take 1 tablet (300 mg total) by mouth once daily., Disp: 90 tablet, Rfl: 3    dextroamphetamine-amphetamine (ADDERALL XR) 30 MG 24 hr capsule, Take 1 capsule (30 mg total) by mouth 2 (two) times a day., Disp: 60 capsule, Rfl: 0    dextroamphetamine-amphetamine (ADDERALL) 30 mg Tab, Take 1 tablet (30 mg total) by mouth 2 (two) times a day., Disp: 60 tablet, Rfl: 0    famotidine (PEPCID) 20 MG tablet, Take 1 tablet (20 mg total) by mouth 2 (two) times daily., Disp: 180 tablet, Rfl: 1    gabapentin (NEURONTIN) 300 MG capsule, Take 300 mg by mouth 3 (three) times daily., Disp: , Rfl:     levothyroxine (SYNTHROID) 125 MCG tablet, Take 1 tablet (125 mcg total) by mouth before breakfast., Disp: 90 tablet, Rfl: 1    tadalafiL (CIALIS) 20 MG Tab, Take 1 tablet (20 mg total) by mouth daily as needed for Erectile Dysfunction., Disp: 30 tablet, Rfl: 11    testosterone cypionate (DEPOTESTOTERONE CYPIONATE) 200 mg/mL injection, INJECT 1ML INTRAMUSCULAR ROUTE ONCE A WEEK AS DIRECTED, Disp: 10 mL, Rfl: 1    There were no vitals taken for this visit.       Objective     Physical Exam  Vitals and nursing note reviewed.   Constitutional:       Appearance: Normal appearance.   HENT:      Head: Normocephalic and atraumatic.   Eyes:      Conjunctiva/sclera: Conjunctivae normal.   Cardiovascular:      Rate and Rhythm: Normal rate and regular rhythm.      Pulses: Normal pulses.      Heart sounds: Normal heart sounds.   Pulmonary:      Effort:  Pulmonary effort is normal.      Breath sounds: Normal breath sounds.   Abdominal:      Palpations: Abdomen is soft.   Musculoskeletal:         General: Normal range of motion.      Cervical back: Normal range of motion and neck supple.   Skin:     Capillary Refill: Capillary refill takes less than 2 seconds.   Neurological:      General: No focal deficit present.      Mental Status: He is alert and oriented to person, place, and time. Mental status is at baseline.   Psychiatric:         Mood and Affect: Mood normal.         Behavior: Behavior normal.         Thought Content: Thought content normal.         Judgment: Judgment normal.            Assessment and Plan     Syncope, unspecified syncope type  -     Comprehensive Metabolic Panel; Future; Expected date: 09/26/2024  -     TSH; Future; Expected date: 09/26/2024  -     Hemoglobin A1C; Future; Expected date: 09/26/2024  -     CBC Auto Differential; Future; Expected date: 09/26/2024  -     IN OFFICE EKG 12-LEAD (to Muse)  -     Holter monitor - 48 hour; Future  -     MRI Brain W WO Contrast; Future; Expected date: 09/26/2024  -     Echo; Future  -     Ambulatory referral/consult to Cardiology; Future; Expected date: 10/03/2024    Syncope and collapse         I spent 30 minutes on this encounter, time includes face-to-face, chart review, documentation, test review and orders.            No follow-ups on file.  Marquise Llanes PA-C

## 2024-09-27 DIAGNOSIS — N18.31 STAGE 3A CHRONIC KIDNEY DISEASE: Primary | ICD-10-CM

## 2024-09-27 LAB
ALBUMIN SERPL BCP-MCNC: 4.2 G/DL (ref 3.5–5.2)
ALBUMIN SERPL BCP-MCNC: 4.2 G/DL (ref 3.5–5.2)
ALP SERPL-CCNC: 33 U/L (ref 55–135)
ALP SERPL-CCNC: 33 U/L (ref 55–135)
ALT SERPL W/O P-5'-P-CCNC: 63 U/L (ref 10–44)
ALT SERPL W/O P-5'-P-CCNC: 63 U/L (ref 10–44)
ANION GAP SERPL CALC-SCNC: 10 MMOL/L (ref 8–16)
ANION GAP SERPL CALC-SCNC: 10 MMOL/L (ref 8–16)
AST SERPL-CCNC: 50 U/L (ref 10–40)
AST SERPL-CCNC: 50 U/L (ref 10–40)
BASOPHILS # BLD AUTO: 0.05 K/UL (ref 0–0.2)
BASOPHILS # BLD AUTO: 0.05 K/UL (ref 0–0.2)
BASOPHILS NFR BLD: 1.1 % (ref 0–1.9)
BASOPHILS NFR BLD: 1.1 % (ref 0–1.9)
BILIRUB SERPL-MCNC: 0.4 MG/DL (ref 0.1–1)
BILIRUB SERPL-MCNC: 0.4 MG/DL (ref 0.1–1)
BUN SERPL-MCNC: 15 MG/DL (ref 6–20)
BUN SERPL-MCNC: 15 MG/DL (ref 6–20)
CALCIUM SERPL-MCNC: 9.5 MG/DL (ref 8.7–10.5)
CALCIUM SERPL-MCNC: 9.5 MG/DL (ref 8.7–10.5)
CHLORIDE SERPL-SCNC: 106 MMOL/L (ref 95–110)
CHLORIDE SERPL-SCNC: 106 MMOL/L (ref 95–110)
CHOLEST SERPL-MCNC: 244 MG/DL (ref 120–199)
CHOLEST/HDLC SERPL: 7.9 {RATIO} (ref 2–5)
CO2 SERPL-SCNC: 26 MMOL/L (ref 23–29)
CO2 SERPL-SCNC: 26 MMOL/L (ref 23–29)
CREAT SERPL-MCNC: 1.6 MG/DL (ref 0.5–1.4)
CREAT SERPL-MCNC: 1.6 MG/DL (ref 0.5–1.4)
DIFFERENTIAL METHOD BLD: ABNORMAL
DIFFERENTIAL METHOD BLD: ABNORMAL
EOSINOPHIL # BLD AUTO: 0.2 K/UL (ref 0–0.5)
EOSINOPHIL # BLD AUTO: 0.2 K/UL (ref 0–0.5)
EOSINOPHIL NFR BLD: 4.5 % (ref 0–8)
EOSINOPHIL NFR BLD: 4.5 % (ref 0–8)
ERYTHROCYTE [DISTWIDTH] IN BLOOD BY AUTOMATED COUNT: 12.7 % (ref 11.5–14.5)
ERYTHROCYTE [DISTWIDTH] IN BLOOD BY AUTOMATED COUNT: 12.7 % (ref 11.5–14.5)
EST. GFR  (NO RACE VARIABLE): 56.9 ML/MIN/1.73 M^2
EST. GFR  (NO RACE VARIABLE): 56.9 ML/MIN/1.73 M^2
ESTIMATED AVG GLUCOSE: 100 MG/DL (ref 68–131)
GLUCOSE SERPL-MCNC: 90 MG/DL (ref 70–110)
GLUCOSE SERPL-MCNC: 90 MG/DL (ref 70–110)
HBA1C MFR BLD: 5.1 % (ref 4–5.6)
HCT VFR BLD AUTO: 54.2 % (ref 40–54)
HCT VFR BLD AUTO: 54.2 % (ref 40–54)
HDLC SERPL-MCNC: 31 MG/DL (ref 40–75)
HDLC SERPL: 12.7 % (ref 20–50)
HGB BLD-MCNC: 17.4 G/DL (ref 14–18)
HGB BLD-MCNC: 17.4 G/DL (ref 14–18)
IMM GRANULOCYTES # BLD AUTO: 0.02 K/UL (ref 0–0.04)
IMM GRANULOCYTES # BLD AUTO: 0.02 K/UL (ref 0–0.04)
IMM GRANULOCYTES NFR BLD AUTO: 0.5 % (ref 0–0.5)
IMM GRANULOCYTES NFR BLD AUTO: 0.5 % (ref 0–0.5)
LDLC SERPL CALC-MCNC: 182.2 MG/DL (ref 63–159)
LYMPHOCYTES # BLD AUTO: 1.5 K/UL (ref 1–4.8)
LYMPHOCYTES # BLD AUTO: 1.5 K/UL (ref 1–4.8)
LYMPHOCYTES NFR BLD: 33.2 % (ref 18–48)
LYMPHOCYTES NFR BLD: 33.2 % (ref 18–48)
MCH RBC QN AUTO: 28.4 PG (ref 27–31)
MCH RBC QN AUTO: 28.4 PG (ref 27–31)
MCHC RBC AUTO-ENTMCNC: 32.1 G/DL (ref 32–36)
MCHC RBC AUTO-ENTMCNC: 32.1 G/DL (ref 32–36)
MCV RBC AUTO: 88 FL (ref 82–98)
MCV RBC AUTO: 88 FL (ref 82–98)
MONOCYTES # BLD AUTO: 0.5 K/UL (ref 0.3–1)
MONOCYTES # BLD AUTO: 0.5 K/UL (ref 0.3–1)
MONOCYTES NFR BLD: 11.6 % (ref 4–15)
MONOCYTES NFR BLD: 11.6 % (ref 4–15)
NEUTROPHILS # BLD AUTO: 2.2 K/UL (ref 1.8–7.7)
NEUTROPHILS # BLD AUTO: 2.2 K/UL (ref 1.8–7.7)
NEUTROPHILS NFR BLD: 49.1 % (ref 38–73)
NEUTROPHILS NFR BLD: 49.1 % (ref 38–73)
NONHDLC SERPL-MCNC: 213 MG/DL
NRBC BLD-RTO: 0 /100 WBC
NRBC BLD-RTO: 0 /100 WBC
PLATELET # BLD AUTO: 272 K/UL (ref 150–450)
PLATELET # BLD AUTO: 272 K/UL (ref 150–450)
PMV BLD AUTO: 9 FL (ref 9.2–12.9)
PMV BLD AUTO: 9 FL (ref 9.2–12.9)
POTASSIUM SERPL-SCNC: 4.5 MMOL/L (ref 3.5–5.1)
POTASSIUM SERPL-SCNC: 4.5 MMOL/L (ref 3.5–5.1)
PROT SERPL-MCNC: 7.1 G/DL (ref 6–8.4)
PROT SERPL-MCNC: 7.1 G/DL (ref 6–8.4)
RBC # BLD AUTO: 6.13 M/UL (ref 4.6–6.2)
RBC # BLD AUTO: 6.13 M/UL (ref 4.6–6.2)
SODIUM SERPL-SCNC: 142 MMOL/L (ref 136–145)
SODIUM SERPL-SCNC: 142 MMOL/L (ref 136–145)
TRIGL SERPL-MCNC: 154 MG/DL (ref 30–150)
TSH SERPL DL<=0.005 MIU/L-ACNC: 2.73 UIU/ML (ref 0.4–4)
TSH SERPL DL<=0.005 MIU/L-ACNC: 2.73 UIU/ML (ref 0.4–4)
WBC # BLD AUTO: 4.4 K/UL (ref 3.9–12.7)
WBC # BLD AUTO: 4.4 K/UL (ref 3.9–12.7)

## 2024-09-30 ENCOUNTER — TELEPHONE (OUTPATIENT)
Dept: FAMILY MEDICINE | Facility: CLINIC | Age: 34
End: 2024-09-30
Payer: COMMERCIAL

## 2024-09-30 NOTE — TELEPHONE ENCOUNTER
Spoke to pt. To let him know about his lab results. Pt. Stated that he has been currently taking mobic. Pt. Stated that the increase of Wellbutrin has made him feel worse. He also said that his wife has been checking his BP and that his bottom number has been high. He is concerned that the increase in the Wellbutrin has cause the BP increase so he stopped the medication.

## 2024-09-30 NOTE — TELEPHONE ENCOUNTER
Spoke to pt. About his lab results. I informed pt. that a referral has been sent over.  Pt. Stated that he has stopped taking his Wellbutrin after it was increase due to making his BP high.

## 2024-09-30 NOTE — TELEPHONE ENCOUNTER
----- Message from Charly Llanes PA-C sent at 9/27/2024  7:18 AM CDT -----  Antoine,     I reviewed your lab work. The kidney function is consistently abnormal. This could be contributing to some of your symptoms. I am not sure the cause of this, so I recommend getting checked out by a Nephrologist to get a better carmen on this. I have placed a referral. If this continues to get worse, it will cause major health problems. I recommend avoiding all anti-inflammatory medication  for pain. Tylenol is ok    The cholesterol is definitely elevated and you will likely will need to be on a cholesterol medication. I will leave this up to the Cardiologist to decide which one.

## 2024-10-01 RX ORDER — FAMOTIDINE 20 MG/1
20 TABLET, FILM COATED ORAL 2 TIMES DAILY
Qty: 180 TABLET | Refills: 1 | Status: SHIPPED | OUTPATIENT
Start: 2024-10-01

## 2024-10-14 ENCOUNTER — HOSPITAL ENCOUNTER (OUTPATIENT)
Dept: CARDIOLOGY | Facility: HOSPITAL | Age: 34
Discharge: HOME | End: 2024-10-14
Attending: PHYSICIAN ASSISTANT
Payer: COMMERCIAL

## 2024-10-14 VITALS — HEIGHT: 69 IN | WEIGHT: 216 LBS | BODY MASS INDEX: 31.99 KG/M2

## 2024-10-14 DIAGNOSIS — R55 SYNCOPE, UNSPECIFIED SYNCOPE TYPE: ICD-10-CM

## 2024-10-14 LAB
ASCENDING AORTA: 3.51 CM
AV INDEX (PROSTH): 0.64
AV MEAN GRADIENT: 5.8 MMHG
AV PEAK GRADIENT: 13 MMHG
AV VALVE AREA BY VELOCITY RATIO: 2.7 CM²
AV VALVE AREA: 3.2 CM²
AV VELOCITY RATIO: 0.56
BSA FOR ECHO PROCEDURE: 2.18 M2
CV ECHO LV RWT: 0.44 CM
DOP CALC AO PEAK VEL: 1.8 M/S
DOP CALC AO VTI: 26.1 CM
DOP CALC LVOT AREA: 4.9 CM2
DOP CALC LVOT DIAMETER: 2.5 CM
DOP CALC LVOT PEAK VEL: 1 M/S
DOP CALC LVOT STROKE VOLUME: 82.4 CM3
DOP CALCLVOT PEAK VEL VTI: 16.8 CM
E WAVE DECELERATION TIME: 207.51 MSEC
E/A RATIO: 0.73
E/E' RATIO: 8 M/S
ECHO LV POSTERIOR WALL: 1.2 CM (ref 0.6–1.1)
EJECTION FRACTION: 60 %
FRACTIONAL SHORTENING: 32.7 % (ref 28–44)
INTERVENTRICULAR SEPTUM: 1.2 CM (ref 0.6–1.1)
IVRT: 114.18 MSEC
LEFT ATRIUM AREA SYSTOLIC (APICAL 2 CHAMBER): 19.38 CM2
LEFT ATRIUM AREA SYSTOLIC (APICAL 4 CHAMBER): 19.71 CM2
LEFT ATRIUM SIZE: 3 CM
LEFT ATRIUM VOLUME INDEX MOD: 25.4 ML/M2
LEFT ATRIUM VOLUME MOD: 54.08 ML
LEFT INTERNAL DIMENSION IN SYSTOLE: 3.7 CM (ref 2.1–4)
LEFT VENTRICLE DIASTOLIC VOLUME INDEX: 68.03 ML/M2
LEFT VENTRICLE DIASTOLIC VOLUME: 144.9 ML
LEFT VENTRICLE END SYSTOLIC VOLUME APICAL 2 CHAMBER: 54.39 ML
LEFT VENTRICLE END SYSTOLIC VOLUME APICAL 4 CHAMBER: 52.44 ML
LEFT VENTRICLE MASS INDEX: 127.9 G/M2
LEFT VENTRICLE SYSTOLIC VOLUME INDEX: 27.9 ML/M2
LEFT VENTRICLE SYSTOLIC VOLUME: 59.51 ML
LEFT VENTRICULAR INTERNAL DIMENSION IN DIASTOLE: 5.5 CM (ref 3.5–6)
LEFT VENTRICULAR MASS: 272.4 G
LV LATERAL E/E' RATIO: 6 M/S
LV SEPTAL E/E' RATIO: 12 M/S
LVED V (TEICH): 144.9 ML
LVES V (TEICH): 59.51 ML
LVOT MG: 2.27 MMHG
LVOT MV: 0.71 CM/S
MV PEAK A VEL: 0.82 M/S
MV PEAK E VEL: 0.6 M/S
MV STENOSIS PRESSURE HALF TIME: 60.18 MS
MV VALVE AREA P 1/2 METHOD: 3.66 CM2
PULM VEIN S/D RATIO: 0.91
PV PEAK D VEL: 0.53 M/S
PV PEAK S VEL: 0.48 M/S
RA PRESSURE ESTIMATED: 3 MMHG
RA VOL SYS: 58.55 ML
RIGHT ATRIAL AREA: 18.2 CM2
RIGHT ATRIUM VOLUME AREA LENGTH APICAL 4 CHAMBER: 55.13 ML
RIGHT VENTRICLE DIASTOLIC LENGTH: 8.4 CM
RIGHT VENTRICLE DIASTOLIC MID DIMENSION: 2.7 CM
RIGHT VENTRICULAR END-DIASTOLIC DIMENSION: 4.14 CM
RIGHT VENTRICULAR LENGTH IN DIASTOLE (APICAL 4-CHAMBER VIEW): 8.4 CM
RV MID DIAMA: 2.65 CM
RV TISSUE DOPPLER FREE WALL SYSTOLIC VELOCITY 1 (APICAL 4 CHAMBER VIEW): 21.03 CM/S
SINUS: 3.57 CM
STJ: 3.46 CM
TDI LATERAL: 0.1 M/S
TDI SEPTAL: 0.05 M/S
TDI: 0.08 M/S
TRICUSPID ANNULAR PLANE SYSTOLIC EXCURSION: 2.26 CM
Z-SCORE OF LEFT VENTRICULAR DIMENSION IN END DIASTOLE: -2.11
Z-SCORE OF LEFT VENTRICULAR DIMENSION IN END SYSTOLE: -0.89

## 2024-10-14 PROCEDURE — 93306 TTE W/DOPPLER COMPLETE: CPT | Mod: 26,,, | Performed by: INTERNAL MEDICINE

## 2024-10-14 PROCEDURE — 93226 XTRNL ECG REC<48 HR SCAN A/R: CPT | Mod: PO

## 2024-10-14 PROCEDURE — 93306 TTE W/DOPPLER COMPLETE: CPT | Mod: PO

## 2024-10-14 PROCEDURE — 93225 XTRNL ECG REC<48 HRS REC: CPT | Mod: PO

## 2024-10-21 ENCOUNTER — HOSPITAL ENCOUNTER (OUTPATIENT)
Dept: RADIOLOGY | Facility: HOSPITAL | Age: 34
Discharge: HOME | End: 2024-10-21
Attending: PHYSICIAN ASSISTANT
Payer: COMMERCIAL

## 2024-10-21 PROCEDURE — 76770 US EXAM ABDO BACK WALL COMP: CPT | Mod: 59,TC,PO

## 2024-10-21 PROCEDURE — 76700 US EXAM ABDOM COMPLETE: CPT | Mod: TC,PO

## 2024-10-21 PROCEDURE — 76700 US EXAM ABDOM COMPLETE: CPT | Mod: 26,,, | Performed by: STUDENT IN AN ORGANIZED HEALTH CARE EDUCATION/TRAINING PROGRAM

## 2024-10-21 RX ORDER — DEXTROAMPHETAMINE SACCHARATE, AMPHETAMINE ASPARTATE, DEXTROAMPHETAMINE SULFATE AND AMPHETAMINE SULFATE 7.5; 7.5; 7.5; 7.5 MG/1; MG/1; MG/1; MG/1
30 TABLET ORAL 2 TIMES DAILY
Qty: 60 TABLET | Refills: 0 | Status: SHIPPED | OUTPATIENT
Start: 2024-10-21

## 2024-10-22 ENCOUNTER — TELEPHONE (OUTPATIENT)
Dept: PRIMARY CARE CLINIC | Facility: CLINIC | Age: 34
End: 2024-10-22
Payer: COMMERCIAL

## 2024-10-22 NOTE — TELEPHONE ENCOUNTER
----- Message from Naheed sent at 10/21/2024  4:49 PM CDT -----  Type:  Pharmacy Calling to Clarify an RX    Name of Caller:pharm  Pharmacy Name:  [facility] - [place] - [facility]  [facility] [facility] [address]  Phone: [phone] Fax: [phone]    Prescription Name:dextroamphetamine-amphetamine (ADDERALL) 30 mg Tab  What do they need to clarify?:both xr bid and plain bid  Best Call Back Number:Phone: [phone] Fax: [phone]    Additional Information: Thanks

## 2024-10-22 NOTE — TELEPHONE ENCOUNTER
Spoke to pt. Pharmacy. They stated that they do have the generic XR Adderall. [facility] asked to send over prescription.

## 2024-10-24 DIAGNOSIS — E03.9 HYPOTHYROIDISM, UNSPECIFIED TYPE: ICD-10-CM

## 2024-10-24 DIAGNOSIS — N17.9 ACUTE RENAL FAILURE, UNSPECIFIED ACUTE RENAL FAILURE TYPE: Primary | ICD-10-CM

## 2024-11-05 ENCOUNTER — TELEPHONE (OUTPATIENT)
Dept: PRIMARY CARE CLINIC | Facility: CLINIC | Age: 34
End: 2024-11-05
Payer: COMMERCIAL

## 2024-11-05 NOTE — TELEPHONE ENCOUNTER
----- Message from Charly Llanes PA-C sent at 10/24/2024  7:06 AM CDT -----  Antoine     The Companion Canineter monitor is normal. I have placed orders for lab as you requested.

## 2024-11-12 DIAGNOSIS — N18.31 STAGE 3A CHRONIC KIDNEY DISEASE: Primary | ICD-10-CM

## 2024-11-20 ENCOUNTER — LAB VISIT (OUTPATIENT)
Dept: LAB | Facility: HOSPITAL | Age: 34
End: 2024-11-20
Payer: COMMERCIAL

## 2024-11-20 DIAGNOSIS — E03.9 HYPOTHYROIDISM, UNSPECIFIED TYPE: ICD-10-CM

## 2024-11-20 DIAGNOSIS — N17.9 ACUTE RENAL FAILURE, UNSPECIFIED ACUTE RENAL FAILURE TYPE: ICD-10-CM

## 2024-11-20 DIAGNOSIS — R79.89 ELEVATED SERUM CREATININE: ICD-10-CM

## 2024-11-20 DIAGNOSIS — N18.31 STAGE 3A CHRONIC KIDNEY DISEASE: ICD-10-CM

## 2024-11-20 DIAGNOSIS — Z00.00 WELLNESS EXAMINATION: ICD-10-CM

## 2024-11-20 LAB
ALBUMIN SERPL BCP-MCNC: 4.1 G/DL (ref 3.5–5.2)
ALP SERPL-CCNC: 35 U/L (ref 40–150)
ALP SERPL-CCNC: 35 U/L (ref 40–150)
ALT SERPL W/O P-5'-P-CCNC: 61 U/L (ref 10–44)
ALT SERPL W/O P-5'-P-CCNC: 61 U/L (ref 10–44)
ANION GAP SERPL CALC-SCNC: 9 MMOL/L (ref 8–16)
AST SERPL-CCNC: 52 U/L (ref 10–40)
AST SERPL-CCNC: 52 U/L (ref 10–40)
BASOPHILS # BLD AUTO: 0.05 K/UL (ref 0–0.2)
BASOPHILS NFR BLD: 1.1 % (ref 0–1.9)
BILIRUB SERPL-MCNC: 0.4 MG/DL (ref 0.1–1)
BILIRUB SERPL-MCNC: 0.4 MG/DL (ref 0.1–1)
BILIRUB UR QL STRIP: NEGATIVE
BUN SERPL-MCNC: 17 MG/DL (ref 6–20)
CALCIUM SERPL-MCNC: 9.4 MG/DL (ref 8.7–10.5)
CHLORIDE SERPL-SCNC: 104 MMOL/L (ref 95–110)
CHOLEST SERPL-MCNC: 196 MG/DL (ref 120–199)
CHOLEST/HDLC SERPL: 5.9 {RATIO} (ref 2–5)
CLARITY UR: CLEAR
CO2 SERPL-SCNC: 24 MMOL/L (ref 23–29)
COLOR UR: YELLOW
CREAT SERPL-MCNC: 1.2 MG/DL (ref 0.5–1.4)
CREAT UR-MCNC: 50 MG/DL (ref 23–375)
DIFFERENTIAL METHOD BLD: NORMAL
EOSINOPHIL # BLD AUTO: 0.2 K/UL (ref 0–0.5)
EOSINOPHIL NFR BLD: 4.7 % (ref 0–8)
ERYTHROCYTE [DISTWIDTH] IN BLOOD BY AUTOMATED COUNT: 12.3 % (ref 11.5–14.5)
EST. GFR  (NO RACE VARIABLE): >60 ML/MIN/1.73 M^2
FERRITIN SERPL-MCNC: 24 NG/ML (ref 20–300)
GLUCOSE SERPL-MCNC: 72 MG/DL (ref 70–110)
GLUCOSE UR QL STRIP: NEGATIVE
HCT VFR BLD AUTO: 52.1 % (ref 40–54)
HDLC SERPL-MCNC: 33 MG/DL (ref 40–75)
HDLC SERPL: 16.8 % (ref 20–50)
HGB BLD-MCNC: 17.4 G/DL (ref 14–18)
HGB UR QL STRIP: NEGATIVE
IMM GRANULOCYTES # BLD AUTO: 0.02 K/UL (ref 0–0.04)
IMM GRANULOCYTES NFR BLD AUTO: 0.4 % (ref 0–0.5)
IRON SERPL-MCNC: 78 UG/DL (ref 45–160)
KETONES UR QL STRIP: NEGATIVE
LDLC SERPL CALC-MCNC: 122.8 MG/DL (ref 63–159)
LEUKOCYTE ESTERASE UR QL STRIP: NEGATIVE
LYMPHOCYTES # BLD AUTO: 1.3 K/UL (ref 1–4.8)
LYMPHOCYTES NFR BLD: 28.2 % (ref 18–48)
MCH RBC QN AUTO: 29.3 PG (ref 27–31)
MCHC RBC AUTO-ENTMCNC: 33.4 G/DL (ref 32–36)
MCV RBC AUTO: 88 FL (ref 82–98)
MONOCYTES # BLD AUTO: 0.5 K/UL (ref 0.3–1)
MONOCYTES NFR BLD: 10.5 % (ref 4–15)
NEUTROPHILS # BLD AUTO: 2.6 K/UL (ref 1.8–7.7)
NEUTROPHILS NFR BLD: 55.1 % (ref 38–73)
NITRITE UR QL STRIP: NEGATIVE
NONHDLC SERPL-MCNC: 163 MG/DL
NRBC BLD-RTO: 0 /100 WBC
PH UR STRIP: 7 [PH] (ref 5–8)
PHOSPHATE SERPL-MCNC: 2.6 MG/DL (ref 2.7–4.5)
PLATELET # BLD AUTO: 251 K/UL (ref 150–450)
PMV BLD AUTO: 9.4 FL (ref 9.2–12.9)
POTASSIUM SERPL-SCNC: 4.2 MMOL/L (ref 3.5–5.1)
PROT SERPL-MCNC: 7.1 G/DL (ref 6–8.4)
PROT SERPL-MCNC: 7.1 G/DL (ref 6–8.4)
PROT UR QL STRIP: NEGATIVE
PROT UR-MCNC: <7 MG/DL (ref 0–15)
PROT/CREAT UR: NORMAL MG/G{CREAT} (ref 0–0.2)
PTH-INTACT SERPL-MCNC: 15 PG/ML (ref 9–77)
RBC # BLD AUTO: 5.94 M/UL (ref 4.6–6.2)
SATURATED IRON: 18 % (ref 20–50)
SODIUM SERPL-SCNC: 137 MMOL/L (ref 136–145)
SP GR UR STRIP: 1.01 (ref 1–1.03)
T4 FREE SERPL-MCNC: 0.92 NG/DL (ref 0.71–1.51)
TOTAL IRON BINDING CAPACITY: 441 UG/DL (ref 250–450)
TRANSFERRIN SERPL-MCNC: 298 MG/DL (ref 200–375)
TRIGL SERPL-MCNC: 201 MG/DL (ref 30–150)
TSH SERPL DL<=0.005 MIU/L-ACNC: 9.07 UIU/ML (ref 0.4–4)
URATE SERPL-MCNC: 4.2 MG/DL (ref 3.4–7)
URN SPEC COLLECT METH UR: NORMAL
WBC # BLD AUTO: 4.68 K/UL (ref 3.9–12.7)

## 2024-11-20 PROCEDURE — 82671 ASSAY OF ESTROGENS: CPT | Performed by: PHYSICIAN ASSISTANT

## 2024-11-20 PROCEDURE — 83970 ASSAY OF PARATHORMONE: CPT | Performed by: STUDENT IN AN ORGANIZED HEALTH CARE EDUCATION/TRAINING PROGRAM

## 2024-11-20 PROCEDURE — 82570 ASSAY OF URINE CREATININE: CPT | Performed by: STUDENT IN AN ORGANIZED HEALTH CARE EDUCATION/TRAINING PROGRAM

## 2024-11-20 PROCEDURE — 82728 ASSAY OF FERRITIN: CPT | Performed by: STUDENT IN AN ORGANIZED HEALTH CARE EDUCATION/TRAINING PROGRAM

## 2024-11-20 PROCEDURE — 85025 COMPLETE CBC W/AUTO DIFF WBC: CPT | Performed by: PHYSICIAN ASSISTANT

## 2024-11-20 PROCEDURE — 83540 ASSAY OF IRON: CPT | Performed by: STUDENT IN AN ORGANIZED HEALTH CARE EDUCATION/TRAINING PROGRAM

## 2024-11-20 PROCEDURE — 81003 URINALYSIS AUTO W/O SCOPE: CPT | Mod: PO | Performed by: STUDENT IN AN ORGANIZED HEALTH CARE EDUCATION/TRAINING PROGRAM

## 2024-11-20 PROCEDURE — 80053 COMPREHEN METABOLIC PANEL: CPT | Performed by: PHYSICIAN ASSISTANT

## 2024-11-20 PROCEDURE — 84439 ASSAY OF FREE THYROXINE: CPT | Performed by: PHYSICIAN ASSISTANT

## 2024-11-20 PROCEDURE — 80069 RENAL FUNCTION PANEL: CPT | Performed by: STUDENT IN AN ORGANIZED HEALTH CARE EDUCATION/TRAINING PROGRAM

## 2024-11-20 PROCEDURE — 84443 ASSAY THYROID STIM HORMONE: CPT | Performed by: PHYSICIAN ASSISTANT

## 2024-11-20 PROCEDURE — 82040 ASSAY OF SERUM ALBUMIN: CPT | Performed by: PHYSICIAN ASSISTANT

## 2024-11-20 PROCEDURE — 80061 LIPID PANEL: CPT | Performed by: PHYSICIAN ASSISTANT

## 2024-11-20 PROCEDURE — 84550 ASSAY OF BLOOD/URIC ACID: CPT | Performed by: STUDENT IN AN ORGANIZED HEALTH CARE EDUCATION/TRAINING PROGRAM

## 2024-11-21 NOTE — PROGRESS NOTES
Subjective     Chief Complaint: CKD/RC    History of Present Illness:  Mr. Antoine Valdez is a 34 y.o. male with active medical diagnosis of DVT 3/10/2023, hypothyroidism, ulcerative colitis, hypogonadism on Testerone replacement    From the moths of 11/2023 through to 9/26/24 - creatinine fluctuated between 1.4 and 1.6. Referral to nephrology made, however at this appointment his creatinine has returned back to 1.2. He has been dealing with fatigue/syncope. Holter completed on 9/26/2024 - normal. Echo also normal.     In Feb - was admitted to [facility] for right leg cellulitis, discharged on Keflex/bactrim.     In addition: he was taking creatinine as a supplement; also has a a significant amount of muscle mass. States that he finds some days he goes to urinate often, sometimes feels like he does not empty his bladder.    #CKD2  Baseline creatinine 1.2  UA negative  UPCR 0    Creatinine   Date Value Ref Range Status   11/20/2024 1.2 0.5 - 1.4 mg/dL Final   11/20/2024 1.2 0.5 - 1.4 mg/dL Final   11/20/2024 1.2 0.5 - 1.4 mg/dL Final     eGFR   Date Value Ref Range Status   11/20/2024 >60.0 >60 mL/min/1.73 m^2 Final   11/20/2024 >60.0 >60 mL/min/1.73 m^2 Final   11/20/2024 >60.0 >60 mL/min/1.73 m^2 Final     Prot/Creat Ratio, Urine   Date Value Ref Range Status   11/20/2024 Unable to calculate 0.00 - 0.20 Final         Review of Systems   Constitutional:  Negative for chills and fever.   HENT:  Negative for ear discharge and ear pain.    Eyes:  Negative for blurred vision and double vision.   Respiratory:  Negative for cough and shortness of breath.    Cardiovascular:  Negative for chest pain and palpitations.   Gastrointestinal:  Negative for abdominal pain, constipation, diarrhea, nausea and vomiting.   Genitourinary:  Negative for dysuria and frequency.   Musculoskeletal:  Negative for myalgias and neck pain.   Skin:  Negative for itching and rash.   Neurological:  Negative for tingling and headaches.             PAST  HISTORY:     Past Medical History:   Diagnosis Date    Allergy     Asthma     DVT (deep venous thrombosis) 03/10/2023    Headache(784.0)     Septic arthritis of knee, left 02/08/2023    SIRS (systemic inflammatory response syndrome) 02/18/2024    Strep throat     Ulcerative colitis        Past Surgical History:   Procedure Laterality Date    CLOSURE OF WOUND Left 2/22/2023    Procedure: CLOSURE, WOUND;  Surgeon: Dante Moulton MD;  Location: [facility] OR;  Service: Orthopedics;  Laterality: Left;    INCISION AND DRAINAGE Left 2/22/2023    Procedure: Incision and Drainage-Knee;  Surgeon: Dante Moulton MD;  Location: [facility] OR;  Service: Orthopedics;  Laterality: Left;    INCISION AND DRAINAGE OF KNEE Left 2/8/2023    Procedure: INCISION AND DRAINAGE, KNEE;  Surgeon: Dante Moulton MD;  Location: [facility] OR;  Service: Orthopedics;  Laterality: Left;    IRRIGATION AND DEBRIDEMENT Left 2/10/2023    Procedure: IRRIGATION AND DEBRIDEMENT LEFT HIP & KNEE W/ WOUND VAC PLACEMENT;  Surgeon: Dante Moulton MD;  Location: [facility] OR;  Service: Orthopedics;  Laterality: Left;    LEG SURGERY         Family History   Problem Relation Name Age of Onset    Diabetes Mother      Heart failure Father         Social History     Socioeconomic History    Marital status:    Tobacco Use    Smoking status: Never    Smokeless tobacco: Never   Substance and Sexual Activity    Alcohol use: No    Drug use: Never    Sexual activity: Yes     Partners: Female     Social Drivers of Health     Financial Resource Strain: Medium Risk (12/7/2023)    Overall Financial Resource Strain (CARDIA)     Difficulty of Paying Living Expenses: Somewhat hard   Food Insecurity: No Food Insecurity (2/19/2024)    Hunger Vital Sign     Worried About Running Out of Food in the Last Year: Never true     Ran Out of Food in the Last Year: Never true   Transportation Needs: No Transportation Needs (2/19/2024)    PRAPARE - Transportation     Lack of  Transportation (Medical): No     Lack of Transportation (Non-Medical): No   Physical Activity: Sufficiently Active (12/7/2023)    Exercise Vital Sign     Days of Exercise per Week: 5 days     Minutes of Exercise per Session: 100 min   Stress: No Stress Concern Present (12/7/2023)    [employer] - Occupational Stress Questionnaire     Feeling of Stress : Only a little   Housing Stability: Low Risk  (2/19/2024)    Housing Stability Vital Sign     Unable to Pay for Housing in the Last Year: No     Number of Places Lived in the Last Year: 2     Unstable Housing in the Last Year: No       MEDICATIONS & ALLERGIES:     Current Outpatient Medications on File Prior to Visit   Medication Sig    acetaminophen (TYLENOL) 325 MG tablet Take 2 tablets (650 mg total) by mouth every 8 (eight) hours as needed for Pain or Temperature greater than (101).    buPROPion (WELLBUTRIN XL) 300 MG 24 hr tablet Take 1 tablet (300 mg total) by mouth once daily.    dextroamphetamine-amphetamine (ADDERALL) 30 mg Tab Take 1 tablet (30 mg total) by mouth 2 (two) times a day.    famotidine (PEPCID) 20 MG tablet TAKE 1 TABLET BY MOUTH TWICE A DAY    gabapentin (NEURONTIN) 300 MG capsule Take 300 mg by mouth 3 (three) times daily.    levothyroxine (SYNTHROID) 125 MCG tablet Take 1 tablet (125 mcg total) by mouth before breakfast.    tadalafiL (CIALIS) 20 MG Tab Take 1 tablet (20 mg total) by mouth daily as needed for Erectile Dysfunction.    testosterone cypionate (DEPOTESTOTERONE CYPIONATE) 200 mg/mL injection INJECT 1ML INTRAMUSCULAR ROUTE ONCE A WEEK AS DIRECTED     No current facility-administered medications on file prior to visit.       Review of patient's allergies indicates:   Allergen Reactions    Morphine Itching       OBJECTIVE:     Vital Signs:  There were no vitals filed for this visit.    There is no height or weight on file to calculate BMI.     Physical Exam  Constitutional:       General: He is not in acute  distress.     Appearance: Normal appearance. He is not ill-appearing or diaphoretic.      Comments: muscular   HENT:      Head: Normocephalic and atraumatic.      Mouth/Throat:      Pharynx: No oropharyngeal exudate or posterior oropharyngeal erythema.   Eyes:      General: No scleral icterus.        Right eye: No discharge.         Left eye: No discharge.      Extraocular Movements: Extraocular movements intact.      Conjunctiva/sclera: Conjunctivae normal.      Pupils: Pupils are equal, round, and reactive to light.   Neck:      Vascular: No JVD.      Trachea: No tracheal deviation.   Cardiovascular:      Rate and Rhythm: Normal rate and regular rhythm.      Heart sounds: No murmur heard.  Pulmonary:      Effort: Pulmonary effort is normal. No respiratory distress.      Breath sounds: Normal breath sounds. No wheezing or rales.   Abdominal:      General: Abdomen is flat. Bowel sounds are normal. There is no distension.      Palpations: Abdomen is soft. There is no mass.      Tenderness: There is no abdominal tenderness.   Musculoskeletal:         General: No swelling, tenderness or deformity. Normal range of motion.      Cervical back: Normal range of motion and neck supple.   Lymphadenopathy:      Cervical: No cervical adenopathy.   Skin:     General: Skin is warm and dry.      Coloration: Skin is not jaundiced or pale.      Findings: No bruising, erythema or rash.   Neurological:      General: No focal deficit present.      Mental Status: He is alert and oriented to person, place, and time. Mental status is at baseline.      Cranial Nerves: No cranial nerve deficit.         Laboratory  Sodium   Date Value Ref Range Status   11/20/2024 137 136 - 145 mmol/L Final   11/20/2024 137 136 - 145 mmol/L Final   11/20/2024 137 136 - 145 mmol/L Final     Potassium   Date Value Ref Range Status   11/20/2024 4.2 3.5 - 5.1 mmol/L Final   11/20/2024 4.2 3.5 - 5.1 mmol/L Final   11/20/2024 4.2 3.5 - 5.1 mmol/L Final      Chloride   Date Value Ref Range Status   11/20/2024 104 95 - 110 mmol/L Final   11/20/2024 104 95 - 110 mmol/L Final   11/20/2024 104 95 - 110 mmol/L Final     CO2   Date Value Ref Range Status   11/20/2024 24 23 - 29 mmol/L Final   11/20/2024 24 23 - 29 mmol/L Final   11/20/2024 24 23 - 29 mmol/L Final     BUN   Date Value Ref Range Status   11/20/2024 17 6 - 20 mg/dL Final   11/20/2024 17 6 - 20 mg/dL Final   11/20/2024 17 6 - 20 mg/dL Final     Creatinine   Date Value Ref Range Status   11/20/2024 1.2 0.5 - 1.4 mg/dL Final   11/20/2024 1.2 0.5 - 1.4 mg/dL Final   11/20/2024 1.2 0.5 - 1.4 mg/dL Final     eGFR   Date Value Ref Range Status   11/20/2024 >60.0 >60 mL/min/1.73 m^2 Final   11/20/2024 >60.0 >60 mL/min/1.73 m^2 Final   11/20/2024 >60.0 >60 mL/min/1.73 m^2 Final     Calcium   Date Value Ref Range Status   11/20/2024 9.4 8.7 - 10.5 mg/dL Final   11/20/2024 9.4 8.7 - 10.5 mg/dL Final   11/20/2024 9.4 8.7 - 10.5 mg/dL Final     Phosphorus   Date Value Ref Range Status   11/20/2024 2.6 (L) 2.7 - 4.5 mg/dL Final   02/16/2023 4.0 2.7 - 4.5 mg/dL Final   02/15/2023 3.4 2.7 - 4.5 mg/dL Final     Albumin   Date Value Ref Range Status   11/20/2024 4.1 3.5 - 5.2 g/dL Final   11/20/2024 4.1 3.5 - 5.2 g/dL Final   11/20/2024 4.1 3.5 - 5.2 g/dL Final       Diagnostic Results:      Health Maintenance Due   Topic Date Due    TETANUS VACCINE  Never done    Influenza Vaccine (1) Never done    COVID-19 Vaccine (1 - 2024-25 season) Never done         ASSESSMENT & PLAN:   Mr. Antoine Valdez is a 34 y.o. male     Elevated creatinine - suspect due to diet supplements and increased muscle mass. Discussed CKD and would like to obtain a cystatin C.    LUTS - check PSA, start tamsulosin.     Stage 2 chronic kidney disease  -     PSA, SCREENING; Future; Expected date: 11/22/2024  -     CYSTATIN C, SERUM; Future; Expected date: 11/22/2024    Stage 3a chronic kidney disease  -     Ambulatory referral/consult to Nephrology    Benign  localized prostatic hyperplasia with lower urinary tract symptoms (LUTS)    Other orders  -     tamsulosin (FLOMAX) 0.4 mg Cap; Take 1 capsule (0.4 mg total) by mouth once daily.  Dispense: 30 capsule; Refill: 11        RTC in PRN      Johnnie Stockton MD  Nephrology [facility] [place]

## 2024-11-22 ENCOUNTER — OFFICE VISIT (OUTPATIENT)
Dept: NEPHROLOGY | Facility: CLINIC | Age: 34
End: 2024-11-22
Payer: COMMERCIAL

## 2024-11-22 ENCOUNTER — LAB VISIT (OUTPATIENT)
Dept: LAB | Facility: HOSPITAL | Age: 34
End: 2024-11-22
Attending: STUDENT IN AN ORGANIZED HEALTH CARE EDUCATION/TRAINING PROGRAM
Payer: COMMERCIAL

## 2024-11-22 VITALS
OXYGEN SATURATION: 97 % | HEART RATE: 75 BPM | SYSTOLIC BLOOD PRESSURE: 122 MMHG | BODY MASS INDEX: 32.3 KG/M2 | RESPIRATION RATE: 18 BRPM | HEIGHT: 70 IN | WEIGHT: 225.63 LBS | DIASTOLIC BLOOD PRESSURE: 82 MMHG

## 2024-11-22 DIAGNOSIS — N40.1 BENIGN LOCALIZED PROSTATIC HYPERPLASIA WITH LOWER URINARY TRACT SYMPTOMS (LUTS): ICD-10-CM

## 2024-11-22 DIAGNOSIS — N18.2 STAGE 2 CHRONIC KIDNEY DISEASE: ICD-10-CM

## 2024-11-22 DIAGNOSIS — N18.31 STAGE 3A CHRONIC KIDNEY DISEASE: ICD-10-CM

## 2024-11-22 DIAGNOSIS — N18.2 STAGE 2 CHRONIC KIDNEY DISEASE: Primary | ICD-10-CM

## 2024-11-22 PROCEDURE — 82610 CYSTATIN C: CPT | Performed by: STUDENT IN AN ORGANIZED HEALTH CARE EDUCATION/TRAINING PROGRAM

## 2024-11-22 PROCEDURE — 84153 ASSAY OF PSA TOTAL: CPT | Performed by: STUDENT IN AN ORGANIZED HEALTH CARE EDUCATION/TRAINING PROGRAM

## 2024-11-22 PROCEDURE — 99999 PR PBB SHADOW E&M-EST. PATIENT-LVL IV: CPT | Mod: PBBFAC,,, | Performed by: STUDENT IN AN ORGANIZED HEALTH CARE EDUCATION/TRAINING PROGRAM

## 2024-11-22 RX ORDER — TAMSULOSIN HYDROCHLORIDE 0.4 MG/1
0.4 CAPSULE ORAL DAILY
Qty: 30 CAPSULE | Refills: 11 | Status: SHIPPED | OUTPATIENT
Start: 2024-11-22 | End: 2025-11-22

## 2024-11-22 NOTE — PATIENT INSTRUCTIONS
Your elevated kidney function is more likely a consequence of the muscle mass that you have. However to prolong the life of your kidneys - drink plenty of fluids, stay away from NSAIDS (advil, motril, ibuprofen) as much as possible.     Come back if you need to see me.

## 2024-11-23 LAB — COMPLEXED PSA SERPL-MCNC: 0.51 NG/ML (ref 0–4)

## 2024-11-25 LAB
CYSTATIN C SERPL-MCNC: 0.73 MG/L (ref 0.63–1.03)
GFR/BSA.PRED SERPLBLD CYS-BASED-ARV: 121 ML/MIN/BSA

## 2024-11-26 ENCOUNTER — PATIENT MESSAGE (OUTPATIENT)
Dept: NEPHROLOGY | Facility: CLINIC | Age: 34
End: 2024-11-26
Payer: COMMERCIAL

## 2024-11-26 DIAGNOSIS — F98.8 ATTENTION DEFICIT DISORDER, UNSPECIFIED TYPE: Primary | ICD-10-CM

## 2024-11-26 DIAGNOSIS — E29.1 HYPOGONADISM IN MALE: Primary | ICD-10-CM

## 2024-11-26 LAB
ESTRADIOL SERPL HS-MCNC: 39 PG/ML (ref 10–42)
ESTROGEN SERPL CALC-MCNC: 89 PG/ML (ref 19–69)
ESTRONE SERPL-MCNC: 50 PG/ML (ref 9–36)

## 2024-11-26 RX ORDER — DEXTROAMPHETAMINE SACCHARATE, AMPHETAMINE ASPARTATE, DEXTROAMPHETAMINE SULFATE AND AMPHETAMINE SULFATE 7.5; 7.5; 7.5; 7.5 MG/1; MG/1; MG/1; MG/1
30 TABLET ORAL 2 TIMES DAILY
Qty: 60 TABLET | Refills: 0 | Status: SHIPPED | OUTPATIENT
Start: 2024-11-26

## 2024-11-26 RX ORDER — ANASTROZOLE 1 MG/1
1 TABLET ORAL WEEKLY
Qty: 12 TABLET | Refills: 3 | Status: SHIPPED | OUTPATIENT
Start: 2024-11-26

## 2024-11-27 DIAGNOSIS — N18.31 STAGE 3A CHRONIC KIDNEY DISEASE: ICD-10-CM

## 2024-11-27 LAB
ALBUMIN SERPL-MCNC: 4.3 G/DL (ref 3.6–5.1)
SHBG SERPL-SCNC: 18 NMOL/L (ref 10–50)
TESTOST FREE SERPL-MCNC: 96.2 PG/ML (ref 46–224)
TESTOST SERPL-MCNC: 449 NG/DL (ref 250–1100)
TESTOSTERONE.FREE+WB SERPL-MCNC: 189.4 NG/DL

## 2024-12-27 DIAGNOSIS — F98.8 ATTENTION DEFICIT DISORDER, UNSPECIFIED TYPE: ICD-10-CM

## 2024-12-27 RX ORDER — DEXTROAMPHETAMINE SACCHARATE, AMPHETAMINE ASPARTATE, DEXTROAMPHETAMINE SULFATE AND AMPHETAMINE SULFATE 7.5; 7.5; 7.5; 7.5 MG/1; MG/1; MG/1; MG/1
30 TABLET ORAL 2 TIMES DAILY
Qty: 60 TABLET | Refills: 0 | Status: SHIPPED | OUTPATIENT
Start: 2024-12-27

## 2024-12-30 RX ORDER — AZITHROMYCIN 250 MG/1
TABLET, FILM COATED ORAL
Qty: 6 TABLET | Refills: 0 | Status: SHIPPED | OUTPATIENT
Start: 2024-12-30

## 2024-12-30 RX ORDER — METHYLPREDNISOLONE 4 MG/1
TABLET ORAL
Qty: 1 EACH | Refills: 0 | Status: SHIPPED | OUTPATIENT
Start: 2024-12-30

## 2025-01-18 DIAGNOSIS — E29.1 HYPOGONADISM IN MALE: ICD-10-CM

## 2025-01-24 RX ORDER — TESTOSTERONE CYPIONATE 200 MG/ML
INJECTION, SOLUTION INTRAMUSCULAR
Qty: 10 ML | Refills: 1 | Status: SHIPPED | OUTPATIENT
Start: 2025-01-24

## 2025-01-27 DIAGNOSIS — F98.8 ATTENTION DEFICIT DISORDER, UNSPECIFIED TYPE: ICD-10-CM

## 2025-01-27 RX ORDER — DEXTROAMPHETAMINE SACCHARATE, AMPHETAMINE ASPARTATE, DEXTROAMPHETAMINE SULFATE AND AMPHETAMINE SULFATE 7.5; 7.5; 7.5; 7.5 MG/1; MG/1; MG/1; MG/1
30 TABLET ORAL 2 TIMES DAILY
Qty: 60 TABLET | Refills: 0 | Status: SHIPPED | OUTPATIENT
Start: 2025-01-27

## 2025-03-05 DIAGNOSIS — F98.8 ATTENTION DEFICIT DISORDER, UNSPECIFIED TYPE: ICD-10-CM

## 2025-03-07 RX ORDER — DEXTROAMPHETAMINE SACCHARATE, AMPHETAMINE ASPARTATE, DEXTROAMPHETAMINE SULFATE AND AMPHETAMINE SULFATE 7.5; 7.5; 7.5; 7.5 MG/1; MG/1; MG/1; MG/1
30 TABLET ORAL 2 TIMES DAILY
Qty: 60 TABLET | Refills: 0 | Status: SHIPPED | OUTPATIENT
Start: 2025-03-07

## 2025-03-25 DIAGNOSIS — F41.9 ANXIETY: ICD-10-CM

## 2025-03-25 RX ORDER — BUPROPION HYDROCHLORIDE 150 MG/1
150 TABLET ORAL
Qty: 90 TABLET | Refills: 3 | Status: SHIPPED | OUTPATIENT
Start: 2025-03-25